# Patient Record
Sex: MALE | Race: WHITE | NOT HISPANIC OR LATINO | Employment: FULL TIME | URBAN - METROPOLITAN AREA
[De-identification: names, ages, dates, MRNs, and addresses within clinical notes are randomized per-mention and may not be internally consistent; named-entity substitution may affect disease eponyms.]

---

## 2024-04-17 ENCOUNTER — OFFICE VISIT (OUTPATIENT)
Dept: FAMILY MEDICINE CLINIC | Facility: CLINIC | Age: 49
End: 2024-04-17
Payer: COMMERCIAL

## 2024-04-17 ENCOUNTER — LAB (OUTPATIENT)
Dept: LAB | Facility: HOSPITAL | Age: 49
End: 2024-04-17
Payer: COMMERCIAL

## 2024-04-17 VITALS
DIASTOLIC BLOOD PRESSURE: 82 MMHG | OXYGEN SATURATION: 98 % | TEMPERATURE: 98 F | SYSTOLIC BLOOD PRESSURE: 132 MMHG | WEIGHT: 265 LBS | HEART RATE: 58 BPM | HEIGHT: 72 IN | BODY MASS INDEX: 35.89 KG/M2

## 2024-04-17 DIAGNOSIS — Z79.1 LONG TERM (CURRENT) USE OF NON-STEROIDAL ANTI-INFLAMMATORIES (NSAID): ICD-10-CM

## 2024-04-17 DIAGNOSIS — M54.42 CHRONIC BILATERAL LOW BACK PAIN WITH LEFT-SIDED SCIATICA: ICD-10-CM

## 2024-04-17 DIAGNOSIS — M16.11 PRIMARY OSTEOARTHRITIS OF RIGHT HIP: Primary | ICD-10-CM

## 2024-04-17 DIAGNOSIS — J30.89 ENVIRONMENTAL AND SEASONAL ALLERGIES: ICD-10-CM

## 2024-04-17 DIAGNOSIS — G89.29 CHRONIC BILATERAL LOW BACK PAIN WITH LEFT-SIDED SCIATICA: ICD-10-CM

## 2024-04-17 PROBLEM — J45.909 ASTHMA: Status: ACTIVE | Noted: 2022-02-28

## 2024-04-17 PROBLEM — K21.9 GASTROESOPHAGEAL REFLUX DISEASE WITHOUT ESOPHAGITIS: Status: ACTIVE | Noted: 2017-09-12

## 2024-04-17 LAB
ALBUMIN SERPL-MCNC: 4.4 G/DL (ref 3.5–5.2)
ALBUMIN/GLOB SERPL: 1.5 G/DL
ALP SERPL-CCNC: 98 U/L (ref 39–117)
ALT SERPL W P-5'-P-CCNC: 31 U/L (ref 1–41)
ANION GAP SERPL CALCULATED.3IONS-SCNC: 11.4 MMOL/L (ref 5–15)
AST SERPL-CCNC: 29 U/L (ref 1–40)
BILIRUB SERPL-MCNC: 0.5 MG/DL (ref 0–1.2)
BUN SERPL-MCNC: 13 MG/DL (ref 6–20)
BUN/CREAT SERPL: 14 (ref 7–25)
CALCIUM SPEC-SCNC: 9.2 MG/DL (ref 8.6–10.5)
CHLORIDE SERPL-SCNC: 104 MMOL/L (ref 98–107)
CO2 SERPL-SCNC: 24.6 MMOL/L (ref 22–29)
CREAT SERPL-MCNC: 0.93 MG/DL (ref 0.76–1.27)
EGFRCR SERPLBLD CKD-EPI 2021: 100.7 ML/MIN/1.73
GLOBULIN UR ELPH-MCNC: 3 GM/DL
GLUCOSE SERPL-MCNC: 99 MG/DL (ref 65–99)
POTASSIUM SERPL-SCNC: 4.1 MMOL/L (ref 3.5–5.2)
PROT SERPL-MCNC: 7.4 G/DL (ref 6–8.5)
SODIUM SERPL-SCNC: 140 MMOL/L (ref 136–145)

## 2024-04-17 PROCEDURE — 99204 OFFICE O/P NEW MOD 45 MIN: CPT | Performed by: FAMILY MEDICINE

## 2024-04-17 PROCEDURE — 80053 COMPREHEN METABOLIC PANEL: CPT

## 2024-04-17 RX ORDER — ALBUTEROL SULFATE 90 UG/1
2 AEROSOL, METERED RESPIRATORY (INHALATION) EVERY 4 HOURS PRN
COMMUNITY

## 2024-04-17 RX ORDER — MELOXICAM 15 MG/1
1 TABLET ORAL DAILY
COMMUNITY
Start: 2024-01-16 | End: 2024-04-17 | Stop reason: SINTOL

## 2024-04-17 RX ORDER — NAPROXEN SODIUM 220 MG
TABLET ORAL
COMMUNITY
End: 2024-04-17 | Stop reason: DRUGHIGH

## 2024-04-17 RX ORDER — OLOPATADINE HYDROCHLORIDE 1 MG/ML
SOLUTION OPHTHALMIC
COMMUNITY
Start: 2024-02-23 | End: 2024-04-17 | Stop reason: SDUPTHER

## 2024-04-17 RX ORDER — CYCLOBENZAPRINE HCL 5 MG
5 TABLET ORAL 3 TIMES DAILY PRN
Qty: 90 TABLET | Refills: 3 | Status: SHIPPED | OUTPATIENT
Start: 2024-04-17

## 2024-04-17 RX ORDER — OLOPATADINE HYDROCHLORIDE 1 MG/ML
1 SOLUTION OPHTHALMIC 2 TIMES DAILY
Qty: 5 ML | Refills: 3 | Status: SHIPPED | OUTPATIENT
Start: 2024-04-17

## 2024-04-17 RX ORDER — NAPROXEN 500 MG/1
500 TABLET ORAL 2 TIMES DAILY WITH MEALS
Qty: 60 TABLET | Refills: 5 | Status: SHIPPED | OUTPATIENT
Start: 2024-04-17

## 2024-04-17 RX ORDER — BACLOFEN 10 MG/1
TABLET ORAL
COMMUNITY
Start: 2024-04-05 | End: 2024-04-17 | Stop reason: CLARIF

## 2024-04-17 RX ORDER — FAMOTIDINE 40 MG/1
TABLET, FILM COATED ORAL
COMMUNITY
Start: 2024-03-07

## 2024-04-17 NOTE — ASSESSMENT & PLAN NOTE
Chronic low back pain which is recently worsened.  He has been following with Dr. Deleon at [facility] for injections.  He is going in for trial of numbing injections on Tuesday and if they can pinpoint where the injections need be given he will likely have an ablation in the future.  Patient will continue naproxen and will increase dose to 500 mg twice daily as needed.  Patient was also given prescription for Flexeril to use 3 times daily as needed for muscle spasms.  Referral to physical therapy for further evaluation and treatment was given as well.  Patient will sign records release so we can get outside records and imaging studies.  [facility] paperwork was filled out today we will place the patient off of work for 1 month and then he will follow-up with me in a month to reassess symptoms to see if he can go back to work at that time.  RTC/ED precautions given.

## 2024-04-17 NOTE — PROGRESS NOTES
Roly Mercedes is a 48 y.o. male who presents today to establish care.    Chief Complaint   Patient presents with    Hip Pain    Back Pain        Patient has had chronic low back pain with left sided sciatica and  chronic right hip pain. He does not have any injury in the past to the hip or back but works as a  and is lifting, twisting, and bending all day. He has seen orthopedic surgery and pain management. He follows with Dr. Moya at [facility] and has had steroid injections in the right hip and low back. He is going in for numbing injections next Tuesday with the goal of possibly doing an ablation if the numbing injections work. He has been to PT in the past but did not get improvement. He has had discectomy at L5-S1 in 2018 or 2019. He has been taking naproxen and baclofen. He has seen some control of pain with naproxen. He is also uses an topical pain relief from Dr. Moya. He has been seeing [facility] for his PCP and she has left the practice. He denies incontinence, weakness, and saddle anesthesia.            Review of Systems   Constitutional:  Negative for fever and unexpected weight loss.   HENT:  Negative for congestion, ear pain and sore throat.    Eyes:  Negative for visual disturbance.   Respiratory:  Negative for cough, shortness of breath and wheezing.    Cardiovascular:  Negative for chest pain and palpitations.   Gastrointestinal:  Negative for abdominal pain, blood in stool, constipation, diarrhea, nausea, vomiting and GERD.   Endocrine: Negative for polydipsia and polyuria.   Genitourinary:  Negative for difficulty urinating.   Musculoskeletal:  Positive for arthralgias and back pain. Negative for joint swelling.   Skin:  Negative for rash and skin lesions.   Allergic/Immunologic: Negative for environmental allergies.   Neurological:  Positive for numbness (some tingling and numbness in the left leg). Negative for seizures and syncope.   Hematological:  Does not bruise/bleed easily.    Psychiatric/Behavioral:  Negative for suicidal ideas.         PHQ-9 Depression Screening  Little interest or pleasure in doing things? 0-->not at all   Feeling down, depressed, or hopeless? 0-->not at all   Trouble falling or staying asleep, or sleeping too much?     Feeling tired or having little energy?     Poor appetite or overeating?     Feeling bad about yourself - or that you are a failure or have let yourself or your family down?     Trouble concentrating on things, such as reading the newspaper or watching television?     Moving or speaking so slowly that other people could have noticed? Or the opposite - being so fidgety or restless that you have been moving around a lot more than usual?     Thoughts that you would be better off dead, or of hurting yourself in some way?     PHQ-9 Total Score 0   If you checked off any problems, how difficult have these problems made it for you to do your work, take care of things at home, or get along with other people?         Past Medical History:   Diagnosis Date    Anxiety     Arthritis     Depression         Past Surgical History:   Procedure Laterality Date    BACK SURGERY      S1- L5    VASECTOMY          Family History   Problem Relation Age of Onset    Other Mother         unknown    Diabetes Father     No Known Problems Brother     Other Maternal Grandmother         unknown    Other Maternal Grandfather         unknown    Other Paternal Grandmother         unknown    Other Paternal Grandfather         unknown    No Known Problems Son     No Known Problems Son         Social History     Socioeconomic History    Marital status:    Tobacco Use    Smoking status: Never     Passive exposure: Never    Smokeless tobacco: Current     Types: Snuff   Vaping Use    Vaping status: Never Used   Substance and Sexual Activity    Alcohol use: Never    Drug use: Never    Sexual activity: Yes     Partners: Female     Birth control/protection: Vasectomy     Comment: 1  "female partner in the last year        Current Outpatient Medications on File Prior to Visit   Medication Sig Dispense Refill    Pepcid 40 MG tablet Take 1 tablet every day by oral route as needed.      sertraline (ZOLOFT) 50 MG tablet Take 1 tablet by mouth Daily.      [DISCONTINUED] baclofen (LIORESAL) 10 MG tablet Take 1 tablet twice a day by oral route as needed for 30 days.      [DISCONTINUED] naproxen sodium (Aleve) 220 MG tablet Take 1 tablet every 12 hours by oral route.      [DISCONTINUED] Pataday 0.1 % ophthalmic solution       albuterol sulfate  (90 Base) MCG/ACT inhaler Inhale 2 puffs Every 4 (Four) Hours As Needed for Wheezing.      [DISCONTINUED] meloxicam (MOBIC) 15 MG tablet Take 1 tablet by mouth Daily. (Patient not taking: Reported on 4/17/2024)       No current facility-administered medications on file prior to visit.       Allergies   Allergen Reactions    Sulfa Antibiotics Other (See Comments)        Visit Vitals  /82   Pulse 58   Temp 98 °F (36.7 °C) (Infrared)   Ht 182.9 cm (72\")   Wt 120 kg (265 lb)   SpO2 98%   BMI 35.94 kg/m²      Body mass index is 35.94 kg/m².    Physical Exam  Constitutional:       General: He is not in acute distress.     Appearance: He is well-developed. He is not diaphoretic.   HENT:      Head: Atraumatic.   Cardiovascular:      Rate and Rhythm: Normal rate and regular rhythm.      Heart sounds: Normal heart sounds. No murmur heard.     No friction rub. No gallop.   Pulmonary:      Effort: Pulmonary effort is normal. No respiratory distress.      Breath sounds: Normal breath sounds. No stridor. No wheezing, rhonchi or rales.   Abdominal:      General: Bowel sounds are normal. There is no distension.      Palpations: Abdomen is soft. There is no mass.      Tenderness: There is no abdominal tenderness. There is no guarding or rebound.      Hernia: No hernia is present.   Musculoskeletal:      Cervical back: Normal range of motion and neck supple.      " Thoracic back: Normal.      Lumbar back: Spasms and tenderness present. No swelling, deformity, lacerations or bony tenderness. Decreased range of motion. Positive left straight leg raise test. Negative right straight leg raise test.      Right hip: No deformity, tenderness, bony tenderness or crepitus. Normal range of motion.   Skin:     General: Skin is warm and dry.   Neurological:      Mental Status: He is alert and oriented to person, place, and time.   Psychiatric:         Behavior: Behavior normal.          No results found for this or any previous visit.     Problems Addressed this Visit          Allergies and Adverse Reactions    Environmental and seasonal allergies     Chronic.  Patient's main symptom is itchy eyes for which he uses Pataday as needed.  Patient was given refill today.         Relevant Medications    Pataday 0.1 % ophthalmic solution       Health Encounters    Long term (current) use of non-steroidal anti-inflammatories (nsaid)    Relevant Orders    Comprehensive Metabolic Panel       Musculoskeletal and Injuries    Osteoarthritis of right hip - Primary     Chronic and stable.  Patient follows with orthopedic surgery.  He will continue naproxen and increase dose to 500 mg twice daily.  Patient given referral to physical therapy for further evaluation and treatment.  RTC/ED precautions given.         Relevant Medications    naproxen (Naprosyn) 500 MG tablet    Other Relevant Orders    Ambulatory Referral to Physical Therapy Evaluate and treat    Chronic bilateral low back pain with left-sided sciatica     Chronic low back pain which is recently worsened.  He has been following with Dr. Deleon at [facility] for injections.  He is going in for trial of numbing injections on Tuesday and if they can pinpoint where the injections need be given he will likely have an ablation in the future.  Patient will continue naproxen and will increase dose to 500 mg twice daily as needed.  Patient was  also given prescription for Flexeril to use 3 times daily as needed for muscle spasms.  Referral to physical therapy for further evaluation and treatment was given as well.  Patient will sign records release so we can get outside records and imaging studies.  [place] paperwork was filled out today we will place the patient off of work for 1 month and then he will follow-up with me in a month to reassess symptoms to see if he can go back to work at that time.  RTC/ED precautions given.         Relevant Medications    naproxen (Naprosyn) 500 MG tablet    cyclobenzaprine (FLEXERIL) 5 MG tablet    Other Relevant Orders    Ambulatory Referral to Physical Therapy Evaluate and treat     Diagnoses         Codes Comments    Primary osteoarthritis of right hip    -  Primary ICD-10-CM: M16.11  ICD-9-CM: 715.15     Chronic bilateral low back pain with left-sided sciatica     ICD-10-CM: M54.42, G89.29  ICD-9-CM: 724.2, 724.3, 338.29     Environmental and seasonal allergies     ICD-10-CM: J30.89  ICD-9-CM: 477.8     Long term (current) use of non-steroidal anti-inflammatories (nsaid)     ICD-10-CM: Z79.1  ICD-9-CM: V58.64             Return in about 29 days (around 5/16/2024) for Follow-up back pain and hip pain.    Roldan More MD  4/17/2024

## 2024-04-17 NOTE — ASSESSMENT & PLAN NOTE
Chronic and stable.  Patient follows with orthopedic surgery.  He will continue naproxen and increase dose to 500 mg twice daily.  Patient given referral to physical therapy for further evaluation and treatment.  RTC/ED precautions given.

## 2024-04-17 NOTE — ASSESSMENT & PLAN NOTE
Chronic.  Patient's main symptom is itchy eyes for which he uses Pataday as needed.  Patient was given refill today.

## 2024-04-17 NOTE — PATIENT INSTRUCTIONS
Chronic Back Pain  Chronic back pain is back pain that lasts longer than 3 months. The cause of your back pain may not be known. Some common causes include:  Wear and tear (degenerative disease) of the bones, disks, or tissues that connect bones to each other (ligaments) in your back.  Inflammation and stiffness in your back (arthritis).  If you have chronic back pain, you may have times when the pain is more intense (flare-ups). You can also learn to manage the pain with home care.  Follow these instructions at home:  Watch for any changes in your symptoms. Take these actions to help with your pain:  Managing pain and stiffness         If told, put ice on the painful area. You may be told to apply ice for the first 24-48 hours after a flare-up starts.  Put ice in a plastic bag.  Place a towel between your skin and the bag.  Leave the ice on for 20 minutes, 2-3 times per day.  If told, apply heat to the affected area as often as told by your health care provider. Use the heat source that your provider recommends, such as a moist heat pack or a heating pad.  Place a towel between your skin and the heat source.  Leave the heat on for 20-30 minutes.  If your skin turns bright red, remove the ice or heat right away to prevent skin damage. The risk of damage is higher if you cannot feel pain, heat, or cold.  Try soaking in a warm tub.  Activity              Avoid bending and other activities that make the pain worse.  Have good posture when you stand or sit.  When you stand, keep your upper back and neck straight, with your shoulders pulled back. Avoid slouching.  When you sit, keep your back straight. Relax your shoulders. Do not round your shoulders or pull them backward.  Do not sit or  one place for too long.  Take brief periods of rest during the day. This will reduce your pain. Resting in a lying or standing position is often better than sitting to rest.  When you rest for longer periods, mix in some mild  activity or stretching between periods of rest. This will help to prevent stiffness and pain.  Get regular exercise. Ask your provider what activities are safe for you.  You may have to avoid lifting. Ask your provider how much you can safely lift. If you do lift, always use the right technique. This means you should:  Bend your knees.  Keep the load close to your body.  Avoid twisting.  Medicines  Take over-the-counter and prescription medicines only as told by your provider.  You may need to take medicines for pain and inflammation. These may be taken by mouth or put on the skin. You may also be given muscle relaxants.  Ask your provider if the medicine prescribed to you:  Requires you to avoid driving or using machinery.  Can cause constipation. You may need to take these actions to prevent or treat constipation:  Drink enough fluid to keep your pee (urine) pale yellow.  Take over-the-counter or prescription medicines.  Eat foods that are high in fiber, such as beans, whole grains, and fresh fruits and vegetables.  Limit foods that are high in fat and processed sugars, such as fried or sweet foods.  General instructions    Sleep on a firm mattress in a comfortable position. Try lying on your side with your knees slightly bent. If you lie on your back, put a pillow under your knees.  Do not use any products that contain nicotine or tobacco. These products include cigarettes, chewing tobacco, and vaping devices, such as e-cigarettes. If you need help quitting, ask your provider.  Contact a health care provider if:  You have pain that does not get better with rest or medicine.  You have new pain.  You have a fever.  You lose weight quickly.  You have trouble doing your normal activities.  You feel weak or numb in one or both of your legs or feet.  Get help right away if:  You are not able to control when you pee or poop.  You have severe back pain and:  Nausea or vomiting.  Pain in your chest or abdomen.  Shortness  of breath.  You faint.  These symptoms may be an emergency. Get help right away. Call 911.  Do not wait to see if the symptoms will go away.  Do not drive yourself to the hospital.  This information is not intended to replace advice given to you by your health care provider. Make sure you discuss any questions you have with your health care provider.  Document Revised: 08/07/2023 Document Reviewed: 08/07/2023  [employer] Patient Education © 2023 [employer]

## 2024-04-26 ENCOUNTER — TREATMENT (OUTPATIENT)
Dept: PHYSICAL THERAPY | Facility: CLINIC | Age: 49
End: 2024-04-26
Payer: COMMERCIAL

## 2024-04-26 DIAGNOSIS — M16.11 ARTHRITIS OF RIGHT HIP: ICD-10-CM

## 2024-04-26 DIAGNOSIS — M25.551 RIGHT HIP PAIN: ICD-10-CM

## 2024-04-26 DIAGNOSIS — M54.42 CHRONIC LEFT-SIDED LOW BACK PAIN WITH LEFT-SIDED SCIATICA: Primary | ICD-10-CM

## 2024-04-26 DIAGNOSIS — M54.17 RADICULOPATHY, LUMBOSACRAL REGION: ICD-10-CM

## 2024-04-26 DIAGNOSIS — G89.29 CHRONIC LEFT-SIDED LOW BACK PAIN WITH LEFT-SIDED SCIATICA: Primary | ICD-10-CM

## 2024-04-26 PROCEDURE — 97012 MECHANICAL TRACTION THERAPY: CPT | Performed by: PHYSICAL THERAPIST

## 2024-04-26 PROCEDURE — 97014 ELECTRIC STIMULATION THERAPY: CPT | Performed by: PHYSICAL THERAPIST

## 2024-04-26 PROCEDURE — 97110 THERAPEUTIC EXERCISES: CPT | Performed by: PHYSICAL THERAPIST

## 2024-04-26 PROCEDURE — 97162 PT EVAL MOD COMPLEX 30 MIN: CPT | Performed by: PHYSICAL THERAPIST

## 2024-04-26 NOTE — PROGRESS NOTES
Physical Therapy Initial Evaluation and Plan of Care  [facility] [place]  [address], [address]  [place] [address]        Patient: Roly Mercedes   : 1975  Diagnosis/ICD-10 Code:  Chronic left-sided low back pain with left-sided sciatica [M54.42, G89.29]  Referring practitioner: Roldan More MD  Date of Initial Visit: 2024  Today's Date: 2024  Patient seen for 1 sessions         Visit Diagnoses:    ICD-10-CM ICD-9-CM   1. Chronic left-sided low back pain with left-sided sciatica  M54.42 724.2    G89.29 724.3     338.29   2. Radiculopathy, lumbosacral region  M54.17 724.4   3. Right hip pain  M25.551 719.45   4. Arthritis of right hip  M16.11 716.95         Subjective Questionnaire: Oswestry:   LEFS: 15/80    Subjective Evaluation    History of Present Illness  Mechanism of injury: 4-5 years ago had a discectomy L5-S1 through [facility], within a month blew it out again, went through rehab. Over time it improved some, went back to work.. 6 months ago the pain significantly increased.  Has seen neurosurgeons for his back, and orthopedic surgeon for the right hip pain. He was told that he needs to Hip replacement but needed to put it off as long as possible.  Indicated that neurosurgery told him only thing they could do for lumbar was a fusion, which he wants to avoid.    Has done hip injections, back injections, numbing shots to test for ablation. He did get a little relief, didn't get any spasming pains. Doesn't sleep at night due to pain. Has been to other PT clinics and has had traction feels like it may help a little. Has an inversion table at home, can not always tolerate it, but times can use it for short period of time.    CURRENT COMPLAINTS  Lumbar: Constant left lumbar pain with varying intensity.  Intermittent but daily pain down the left lower extremity past the knee.  Symptoms are more down to the lateral and posterior aspect of the left lower extremity.   Symptoms disrupt his sleep.  Worse with prolonged positions, worsens as the day progresses, prolonged standing, going sit/stand, coughing and sneezing.      Right hip: Intermittent right anterior hip joint pain present most of the time.  Right hip pain can become severe at times.  Worse with sitting, prolonged standing, prolonged walking, flexing hip past 90 degrees, and right side-lying.      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     Hip   Hip (Left): Increased flexion.   Hip (Right): Increased flexion.     Tenderness     Additional Tenderness Details  Left L4-L5, left L5-S1 very tender and causes radiating discomfort.  Mild to moderate tenderness over the left SI joint but not as severe as the lumbar spine.  Very tender anterior right hip joint.    Neurological Testing     Reflexes   Left   Patellar (L4): trace (1+)  Achilles (S1): normal (2+)    Right   Patellar (L4): normal (2+)  Achilles (S1): normal (2+)    Active Range of Motion     Lumbar   Flexion: 70 (More painful to come back upright.  Was to flex.) degrees with pain  Extension: 15 degrees with pain  Left rotation: with pain  Right rotation: with pain    Right Hip   Flexion: 85 degrees with pain  Extension: 5 degrees with pain  Abduction: 30 degrees with pain    Additional Active Range of Motion Details  He avoids rotation movements of the trunk.    Passive Range of Motion     Right Hip   Flexion: 105 degrees with pain  Abduction: 50 degrees with pain  External rotation (90/90): 60 degrees with pain  Internal rotation (90/90): 40 degrees with pain    Strength/Myotome Testing      Left Hip   Planes of Motion   Flexion: 3-  Extension: 5  Abduction: 5    Right Hip   Planes of Motion   Flexion: 3  Extension: 4+  Abduction: 4+    Left Knee   Extension: 3+    Right Knee   Extension: 3+    Left Ankle/Foot   Dorsiflexion: 4+  Plantar flexion: 4  Great toe extension: 4    Right Ankle/Foot   Dorsiflexion: 4+  Plantar flexion: 4  Great toe extension: 4    Tests       Thoracic   Positive slump.     Lumbar     Left   Positive passive SLR and quadrant.     Right   Negative passive SLR.     Right Hip   Positive WILLA, FADIR and scour.     Additional Tests Details  Prone bilateral lower extremity distraction increases right hip pain.    Prone left lower extremity distraction recent lumbar pain and left lower extremities    Ambulation   Weight-Bearing Status   Weight-Bearing Status (Left): full weight bearing   Weight-Bearing Status (Right): full weight-bearing    Assistive device used: none    Observational Gait   Gait: antalgic     Quality of Movement During Gait   Trunk  Forward lean.           Assessment & Plan       Assessment  Impairments: abnormal or restricted ROM, activity intolerance, lacks appropriate home exercise program and pain with function   Functional limitations: carrying objects, lifting, sleeping, pulling, pushing, uncomfortable because of pain, sitting, standing and stooping   Assessment details: Patient is a 48 old male, presenting with a chronic left sided lumbar spine pain likely of discogenic origin though cannot rule out other origins.  They presents with signs and symptoms of chronic low back pain with left radiating symptoms down the lateral aspect of his lower extremity to the knee.  Muscle guarding is significant at this time with paraspinals and QL.  Cannot rule out SI joint involvement at this time.  Neurological exam is abnormal today as he demonstrates significant neural tension when his left leg is extended during the slump test and a positive SLR.      Right hip has  imaging studies and clinical signs and symptoms of degenerative joint disease.  His hip range of motion is within normal limits but is very painful.    Patient seen a significant exacerbation of both his lumbar spine and right hip.  Suspect the lumbar is a bigger issue at this point in time.  Right hip can probably be managed with medication injections and rehab.  Prognosis: good    Goals  Plan Goals: STG to be met in 4 weeks  1.  Pt has a reduction in worst pain to 6/10.  2.  Pt has centralization of symptoms so that their symptoms improve.  3.  Pt has improved function so that Oswestry score improves by 25%  4.  Pt is able tolerate sitting/standing for up to 30 minutes before needing to change position.  5.Pt demonstrates a negative SLR signifying improved neural tension    LTG to be met in 12 weeks  1.  Pt is independent with each phase of HEP.  2.  Pt has centralization of symptoms so that their symptoms are tolerable per their report.  3.  Pt has improved function so that Oswestry score improves by 75%.  4.  Pt has a reduction in worst pain to 3/10.  5.  Pt is able to return to work with tolerable discomfort.      Plan  Therapy options: will be seen for skilled therapy services  Planned modality interventions: dry needling, high voltage pulsed current (pain management), high voltage pulsed current (spasm management), ultrasound, traction, cryotherapy, TENS, thermotherapy (hydrocollator packs) and electrical stimulation/Russian stimulation  Planned therapy interventions: abdominal trunk stabilization, balance/weight-bearing training, body mechanics training, flexibility, functional ROM exercises, home exercise program, joint mobilization, strengthening, stretching, therapeutic activities, spinal/joint mobilization, soft tissue mobilization, postural training, neuromuscular re-education and manual therapy  Frequency: 1x week  Duration in weeks: 12      Timed:         Manual Therapy:         mins  02199;      Therapeutic Exercise:    8     mins  63678;     Neuromuscular Chano:        mins  00128;    Therapeutic Activity:          mins  16049;     Gait Training:           mins  96810;     Ultrasound:          mins  24444;    Ionto                                   mins   12041  Self Care                            mins   42715  Canalith Repos         mins 16929      Un-Timed:  Electrical Stimulation:   20      mins  10348 ( );  Dry Needling          mins self-pay  Traction     20     mins 61493  Low Eval          Mins  69645  Mod Eval     30     Mins  67684  High Eval                            Mins  57692        Timed Treatment:   8   mins   Total Treatment:     78   mins    Madelin Farr, Student Physical Therapist    I was present in the PT Department guiding the student by approving, concurring, and confirming the skilled judgement for all services rendered.       PT: Roly Fountain PT     License Number: [id number]  Electronically signed by Madelin Farr PT Student, 04/26/24, 8:04 AM EDT    Initial Certification  Certification Period: 7/25/2024  I certify that the therapy services are furnished while this patient is under my care.  The services outlined above are required by this patient, and will be reviewed every 90 days.     PHYSICIAN: ________________________________________________________  Roldan More MD        DATE: ____________________________________________________________    Please sign and return via fax to [phone].. Thank you, [facility].

## 2024-04-29 ENCOUNTER — TREATMENT (OUTPATIENT)
Dept: PHYSICAL THERAPY | Facility: CLINIC | Age: 49
End: 2024-04-29
Payer: COMMERCIAL

## 2024-04-29 DIAGNOSIS — M16.11 ARTHRITIS OF RIGHT HIP: ICD-10-CM

## 2024-04-29 DIAGNOSIS — M54.42 CHRONIC LEFT-SIDED LOW BACK PAIN WITH LEFT-SIDED SCIATICA: Primary | ICD-10-CM

## 2024-04-29 DIAGNOSIS — M54.17 RADICULOPATHY, LUMBOSACRAL REGION: ICD-10-CM

## 2024-04-29 DIAGNOSIS — G89.29 CHRONIC LEFT-SIDED LOW BACK PAIN WITH LEFT-SIDED SCIATICA: Primary | ICD-10-CM

## 2024-04-29 DIAGNOSIS — M25.551 RIGHT HIP PAIN: ICD-10-CM

## 2024-04-29 PROCEDURE — 97012 MECHANICAL TRACTION THERAPY: CPT | Performed by: PHYSICAL THERAPIST

## 2024-04-29 PROCEDURE — 97110 THERAPEUTIC EXERCISES: CPT | Performed by: PHYSICAL THERAPIST

## 2024-04-29 PROCEDURE — 97014 ELECTRIC STIMULATION THERAPY: CPT | Performed by: PHYSICAL THERAPIST

## 2024-04-29 NOTE — PROGRESS NOTES
Physical Therapy Daily Treatment Note  [facility] [place]  [address], [address]  [facility] [place] [address]      Patient: Roly Mercedes   : 1975  Diagnosis/ICD-10 Code:  Chronic left-sided low back pain with left-sided sciatica [M54.42, G89.29]  Referring practitioner: Roldan More MD  Date of Initial Visit: Type: THERAPY  Noted: 2024  Today's Date: 2024  Patient seen for 2 sessions         Visit Diagnoses:    ICD-10-CM ICD-9-CM   1. Chronic left-sided low back pain with left-sided sciatica  M54.42 724.2    G89.29 724.3     338.29   2. Radiculopathy, lumbosacral region  M54.17 724.4   3. Right hip pain  M25.551 719.45   4. Arthritis of right hip  M16.11 716.95       Subjective   Roly Mercedes reports: hip pain bad over weekend.  The press up seem helpful. Was painful after PT evaluation on Friday.  No left LE symptoms today.  LBP 3/10.      Objective   OBSERVATION: posture WNL.  Gait, antalgic when he 1st stands from sitting, improves with steps.  PALPATION: Right anterior hip joint.  Left lower lumbar facet area.  ROM: Right hip flexion 100 deg with pain.  STRENGTH: Right hip flexion 4-/5, extension 4-/5.  OTHER: Prone distraction decrease hip and lumbar pain.    See Exercise, Manual, and Modality Logs for complete treatment.     Access Code: [email or web address]  URL: [email or web address]  Date: 2024  Prepared by: Roly Fountain    Exercises  - Prone Quadriceps Stretch with Strap  - 2 x daily - 7 x weekly - 1 sets - 10 reps - 10 seconds hold  - Doorway Kneeling Hip Flexor Stretch  - 2 x daily - 7 x weekly - 2 sets - 10 reps - 5 seconds hold  - Supine Bridge  - 2 x daily - 7 x weekly - 2-3 sets - 10 reps    Assessment/Plan  Patient's right hip pain appears to be more superficial than actual hip joint itself today.  He is very tender and guarded in the hip flexors and the anterior fascia.  He has weakness around the hip joint.  He continues to respond well to the traction of  the lumbar spine.  Hip pain lessen as he worked through some of his exercises today.  Progress per Plan of Care and Progress strengthening /stabilization /functional activity           Timed:         Manual Therapy:         mins  96768;     Therapeutic Exercise:    30     mins  74783;     Neuromuscular Chano:        mins  00850;    Therapeutic Activity:          mins  72332;     Gait Training:           mins  01423;     Ultrasound:          mins  08060;    Ionto                                   mins   90177  Self Care                            mins   68043  Canalith Repos         mins 45856      Un-Timed:  Electrical Stimulation:    20     mins  02487 ( );  Dry Needling          mins self-pay  Traction     20     mins 02347      Timed Treatment:   30   mins   Total Treatment:     70   mins    Roly Fountain, PT  [place] License: [id number]

## 2024-05-03 ENCOUNTER — TREATMENT (OUTPATIENT)
Dept: PHYSICAL THERAPY | Facility: CLINIC | Age: 49
End: 2024-05-03
Payer: COMMERCIAL

## 2024-05-03 DIAGNOSIS — G89.29 CHRONIC LEFT-SIDED LOW BACK PAIN WITH LEFT-SIDED SCIATICA: Primary | ICD-10-CM

## 2024-05-03 DIAGNOSIS — M54.17 RADICULOPATHY, LUMBOSACRAL REGION: ICD-10-CM

## 2024-05-03 DIAGNOSIS — M16.11 ARTHRITIS OF RIGHT HIP: ICD-10-CM

## 2024-05-03 DIAGNOSIS — M54.42 CHRONIC LEFT-SIDED LOW BACK PAIN WITH LEFT-SIDED SCIATICA: Primary | ICD-10-CM

## 2024-05-03 DIAGNOSIS — M25.551 RIGHT HIP PAIN: ICD-10-CM

## 2024-05-03 PROCEDURE — 97014 ELECTRIC STIMULATION THERAPY: CPT | Performed by: PHYSICAL THERAPIST

## 2024-05-03 PROCEDURE — 97012 MECHANICAL TRACTION THERAPY: CPT | Performed by: PHYSICAL THERAPIST

## 2024-05-03 PROCEDURE — 97140 MANUAL THERAPY 1/> REGIONS: CPT | Performed by: PHYSICAL THERAPIST

## 2024-05-03 PROCEDURE — 97110 THERAPEUTIC EXERCISES: CPT | Performed by: PHYSICAL THERAPIST

## 2024-05-13 ENCOUNTER — TREATMENT (OUTPATIENT)
Dept: PHYSICAL THERAPY | Facility: CLINIC | Age: 49
End: 2024-05-13
Payer: COMMERCIAL

## 2024-05-13 DIAGNOSIS — G89.29 CHRONIC LEFT-SIDED LOW BACK PAIN WITH LEFT-SIDED SCIATICA: Primary | ICD-10-CM

## 2024-05-13 DIAGNOSIS — M16.11 ARTHRITIS OF RIGHT HIP: ICD-10-CM

## 2024-05-13 DIAGNOSIS — M25.551 RIGHT HIP PAIN: ICD-10-CM

## 2024-05-13 DIAGNOSIS — M54.17 RADICULOPATHY, LUMBOSACRAL REGION: ICD-10-CM

## 2024-05-13 DIAGNOSIS — M54.42 CHRONIC LEFT-SIDED LOW BACK PAIN WITH LEFT-SIDED SCIATICA: Primary | ICD-10-CM

## 2024-05-13 PROCEDURE — 97012 MECHANICAL TRACTION THERAPY: CPT | Performed by: PHYSICAL THERAPIST

## 2024-05-13 PROCEDURE — 97014 ELECTRIC STIMULATION THERAPY: CPT | Performed by: PHYSICAL THERAPIST

## 2024-05-13 PROCEDURE — 97140 MANUAL THERAPY 1/> REGIONS: CPT | Performed by: PHYSICAL THERAPIST

## 2024-05-13 PROCEDURE — 97110 THERAPEUTIC EXERCISES: CPT | Performed by: PHYSICAL THERAPIST

## 2024-05-13 NOTE — PROGRESS NOTES
Physical Therapy Daily Treatment Note  [facility] [place] [facility] [place]  [address], [address]  [place] [facility] [address]      Patient: Roly Mercedes   : 1975  Diagnosis/ICD-10 Code:  Chronic left-sided low back pain with left-sided sciatica [M54.42, G89.29]  Referring practitioner: Roldan More MD  Date of Initial Visit: Type: THERAPY  Noted: 2024  Today's Date: 2024  Patient seen for 4 sessions         Visit Diagnoses:    ICD-10-CM ICD-9-CM   1. Chronic left-sided low back pain with left-sided sciatica  M54.42 724.2    G89.29 724.3     338.29   2. Radiculopathy, lumbosacral region  M54.17 724.4   3. Right hip pain  M25.551 719.45   4. Arthritis of right hip  M16.11 716.95       Subjective   Roly Mercedes reports: LBP is worsening over past week. Right hip is killing him.  Scheduled for lumbar radiofrequency rhizotomy tomorrow.  He is going to ask his physician to schedule him for a right hip joint injection.    Objective   OBSERVATION: Antalgic gait.  Hip flexion posture when standing and walking.  PALPATION: Tender left L5-S1 area.  Very tender at right anterior and posterior hip joints.  ROM: Right hip abduction 25 degrees with severe pain, right hip flexion 95 degrees with severe pain, internal rotation 5 degrees pain, external rotation 30 degrees pain.  STRENGTH: Strength around the hip is affected by the severity of the hip joint pain unable to truly assess.  OTHER: Lumbar distraction decreases pain.    See Exercise, Manual, and Modality Logs for complete treatment.     MANUAL THERAPY TO RIGHT HIP JOINT. MECHANICAL TRACTION TO LUMBAR SPINE.    Assessment/Plan  Patient gets some relief from the hip joint pain with the manual therapy.  Get some relief from the lumbar pain with traction.  Both pathologies are significantly reduced as soon his ability to function.  Suspect that if he were to get a hip joint arthroplasty this would probably help some of his back pain.  Progress per Plan  of Care and Progress strengthening /stabilization /functional activity           Timed:         Manual Therapy:    18     mins  05379;     Therapeutic Exercise:    10     mins  20444;     Neuromuscular Chano:        mins  71986;    Therapeutic Activity:          mins  21571;     Gait Training:           mins  86834;     Ultrasound:          mins  39993;    Ionto                                   mins   10519  Self Care                            mins   72303  Canalith Repos         mins 94724      Un-Timed:  Electrical Stimulation:    20     mins  52960 ( );  Dry Needling          mins self-pay  Traction     20     mins 78463      Timed Treatment:   18   mins   Total Treatment:     68   mins    Roly Fountain, PT  [place] License: [id number]

## 2024-05-16 ENCOUNTER — OFFICE VISIT (OUTPATIENT)
Dept: FAMILY MEDICINE CLINIC | Facility: CLINIC | Age: 49
End: 2024-05-16
Payer: COMMERCIAL

## 2024-05-16 VITALS
OXYGEN SATURATION: 96 % | TEMPERATURE: 98.2 F | HEIGHT: 72 IN | SYSTOLIC BLOOD PRESSURE: 138 MMHG | DIASTOLIC BLOOD PRESSURE: 92 MMHG | HEART RATE: 54 BPM | BODY MASS INDEX: 36.41 KG/M2 | WEIGHT: 268.8 LBS

## 2024-05-16 DIAGNOSIS — M54.42 CHRONIC BILATERAL LOW BACK PAIN WITH LEFT-SIDED SCIATICA: Primary | ICD-10-CM

## 2024-05-16 DIAGNOSIS — M16.11 PRIMARY OSTEOARTHRITIS OF RIGHT HIP: ICD-10-CM

## 2024-05-16 DIAGNOSIS — Z79.1 LONG TERM (CURRENT) USE OF NON-STEROIDAL ANTI-INFLAMMATORIES (NSAID): ICD-10-CM

## 2024-05-16 DIAGNOSIS — G89.29 CHRONIC BILATERAL LOW BACK PAIN WITH LEFT-SIDED SCIATICA: Primary | ICD-10-CM

## 2024-05-16 PROCEDURE — 99213 OFFICE O/P EST LOW 20 MIN: CPT | Performed by: FAMILY MEDICINE

## 2024-05-16 RX ORDER — AMOXICILLIN AND CLAVULANATE POTASSIUM 875; 125 MG/1; MG/1
TABLET, FILM COATED ORAL
COMMUNITY
Start: 2024-03-26

## 2024-05-16 NOTE — ASSESSMENT & PLAN NOTE
Patient has not been using naproxen consistently but does see some relief when he takes it.  Patient was advised to take naproxen twice daily with meals and Tylenol twice a day with meals and an extra dose Tylenol midday if needed.  He will continue to follow with pain management and physical therapy.  Will give patient a note to remain out of work until after he has the next round of injections.  Patient will follow-up in 6 to 8 weeks.  RTC/ED precautions given.

## 2024-05-16 NOTE — PROGRESS NOTES
Roly Mercedes is a 48 y.o. male who presents today for Back Pain and Hip Pain (Follow Up)      Patient is here to follow up on chronic back and hip pain. He has been taking naproxen 500mg twice a day and flexeril 5 mg TID. He feels like the medications have helped and flexeril helps him sleep. He got an injection yesterday and feels like it has helped. He has ablation scheduled June 3rd for the back and the next hip injection on June 10th. Both with Dr. Moya. He has been following with PT and sees improvement the day of but does not see lasting relief. He has not gone back to work yet. 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  Patient has no acute complaints today.             Review of Systems   Constitutional:  Negative for fever and unexpected weight loss.   HENT:  Negative for congestion, ear pain and sore throat.    Eyes:  Negative for visual disturbance.   Respiratory:  Negative for cough, shortness of breath and wheezing.    Cardiovascular:  Negative for chest pain and palpitations.   Gastrointestinal:  Negative for abdominal pain, blood in stool, constipation, diarrhea, nausea, vomiting and GERD.   Endocrine: Negative for polydipsia and polyuria.   Genitourinary:  Negative for difficulty urinating.   Musculoskeletal:  Positive for arthralgias and back pain. Negative for joint swelling.   Skin:  Negative for rash and skin lesions.   Allergic/Immunologic: Negative for environmental allergies.   Neurological:  Positive for numbness (some tingling and numbness in the left leg). Negative for seizures and syncope.   Hematological:  Does not bruise/bleed easily.   Psychiatric/Behavioral:  Negative for suicidal ideas.         The following portions of the patient's history were reviewed and updated as appropriate: allergies, current medications, past family history, past medical history, past social history, past surgical history and problem list.    Current  "Outpatient Medications on File Prior to Visit   Medication Sig Dispense Refill    amoxicillin-clavulanate (AUGMENTIN) 875-125 MG per tablet       cyclobenzaprine (FLEXERIL) 5 MG tablet Take 1 tablet by mouth 3 (Three) Times a Day As Needed for Muscle Spasms. 90 tablet 3    naproxen (Naprosyn) 500 MG tablet Take 1 tablet by mouth 2 (Two) Times a Day With Meals. 60 tablet 5    Pataday 0.1 % ophthalmic solution Administer 1 drop to both eyes 2 (Two) Times a Day. 5 mL 3    Pepcid 40 MG tablet Take 1 tablet every day by oral route as needed.      sertraline (ZOLOFT) 50 MG tablet Take 1 tablet by mouth Daily.      albuterol sulfate  (90 Base) MCG/ACT inhaler Inhale 2 puffs Every 4 (Four) Hours As Needed for Wheezing. (Patient not taking: Reported on 5/16/2024)       No current facility-administered medications on file prior to visit.       Allergies   Allergen Reactions    Sulfa Antibiotics Other (See Comments)        Visit Vitals  /92   Pulse 54   Temp 98.2 °F (36.8 °C) (Infrared)   Ht 182.9 cm (72\")   Wt 122 kg (268 lb 12.8 oz)   SpO2 96%   BMI 36.46 kg/m²        Physical Exam  Constitutional:       General: He is not in acute distress.     Appearance: He is well-developed. He is not diaphoretic.   HENT:      Head: Atraumatic.   Cardiovascular:      Rate and Rhythm: Normal rate and regular rhythm.      Heart sounds: Normal heart sounds. No murmur heard.     No friction rub. No gallop.   Pulmonary:      Effort: Pulmonary effort is normal. No respiratory distress.      Breath sounds: Normal breath sounds. No stridor. No wheezing, rhonchi or rales.   Abdominal:      General: Bowel sounds are normal. There is no distension.      Palpations: Abdomen is soft. There is no mass.      Tenderness: There is no abdominal tenderness. There is no guarding or rebound.      Hernia: No hernia is present.   Musculoskeletal:      Cervical back: Normal range of motion and neck supple.      Thoracic back: Normal.      Lumbar " back: Spasms and tenderness present. No swelling, deformity, lacerations or bony tenderness. Decreased range of motion. Positive left straight leg raise test. Negative right straight leg raise test.      Right hip: No deformity, tenderness, bony tenderness or crepitus. Normal range of motion.   Skin:     General: Skin is warm and dry.   Neurological:      Mental Status: He is alert and oriented to person, place, and time.   Psychiatric:         Behavior: Behavior normal.          Results for orders placed or performed in visit on 04/17/24   Comprehensive Metabolic Panel    Specimen: Blood   Result Value Ref Range    Glucose 99 65 - 99 mg/dL    BUN 13 6 - 20 mg/dL    Creatinine 0.93 0.76 - 1.27 mg/dL    Sodium 140 136 - 145 mmol/L    Potassium 4.1 3.5 - 5.2 mmol/L    Chloride 104 98 - 107 mmol/L    CO2 24.6 22.0 - 29.0 mmol/L    Calcium 9.2 8.6 - 10.5 mg/dL    Total Protein 7.4 6.0 - 8.5 g/dL    Albumin 4.4 3.5 - 5.2 g/dL    ALT (SGPT) 31 1 - 41 U/L    AST (SGOT) 29 1 - 40 U/L    Alkaline Phosphatase 98 39 - 117 U/L    Total Bilirubin 0.5 0.0 - 1.2 mg/dL    Globulin 3.0 gm/dL    A/G Ratio 1.5 g/dL    BUN/Creatinine Ratio 14.0 7.0 - 25.0    Anion Gap 11.4 5.0 - 15.0 mmol/L    eGFR 100.7 >60.0 mL/min/1.73        Problems Addressed this Visit          Health Encounters    Long term (current) use of non-steroidal anti-inflammatories (nsaid)    Relevant Orders    Comprehensive Metabolic Panel       Musculoskeletal and Injuries    Osteoarthritis of right hip     Patient will continue to follow with orthopedic surgery as well as physical therapy and pain management.  NSAID and Tylenol as above.  RTC/ED precautions given.         Chronic bilateral low back pain with left-sided sciatica - Primary     Patient has not been using naproxen consistently but does see some relief when he takes it.  Patient was advised to take naproxen twice daily with meals and Tylenol twice a day with meals and an extra dose Tylenol midday if  needed.  He will continue to follow with pain management and physical therapy.  Will give patient a note to remain out of work until after he has the next round of injections.  Patient will follow-up in 6 to 8 weeks.  RTC/ED precautions given.          Diagnoses         Codes Comments    Chronic bilateral low back pain with left-sided sciatica    -  Primary ICD-10-CM: M54.42, G89.29  ICD-9-CM: 724.2, 724.3, 338.29     Long term (current) use of non-steroidal anti-inflammatories (nsaid)     ICD-10-CM: Z79.1  ICD-9-CM: V58.64     Primary osteoarthritis of right hip     ICD-10-CM: M16.11  ICD-9-CM: 715.15             Return in about 7 weeks (around 7/3/2024) for Follow-up back pain and hip pain.    Roldan More MD   5/16/2024

## 2024-05-16 NOTE — ASSESSMENT & PLAN NOTE
Patient will continue to follow with orthopedic surgery as well as physical therapy and pain management.  NSAID and Tylenol as above.  RTC/ED precautions given.

## 2024-05-16 NOTE — PATIENT INSTRUCTIONS
Chronic Back Pain  Chronic back pain is back pain that lasts longer than 3 months. The cause of your back pain may not be known. Some common causes include:  Wear and tear (degenerative disease) of the bones, disks, or tissues that connect bones to each other (ligaments) in your back.  Inflammation and stiffness in your back (arthritis).  If you have chronic back pain, you may have times when the pain is more intense (flare-ups). You can also learn to manage the pain with home care.  Follow these instructions at home:  Watch for any changes in your symptoms. Take these actions to help with your pain:  Managing pain and stiffness         If told, put ice on the painful area. You may be told to apply ice for the first 24-48 hours after a flare-up starts.  Put ice in a plastic bag.  Place a towel between your skin and the bag.  Leave the ice on for 20 minutes, 2-3 times per day.  If told, apply heat to the affected area as often as told by your health care provider. Use the heat source that your provider recommends, such as a moist heat pack or a heating pad.  Place a towel between your skin and the heat source.  Leave the heat on for 20-30 minutes.  If your skin turns bright red, remove the ice or heat right away to prevent skin damage. The risk of damage is higher if you cannot feel pain, heat, or cold.  Try soaking in a warm tub.  Activity              Avoid bending and other activities that make the pain worse.  Have good posture when you stand or sit.  When you stand, keep your upper back and neck straight, with your shoulders pulled back. Avoid slouching.  When you sit, keep your back straight. Relax your shoulders. Do not round your shoulders or pull them backward.  Do not sit or  one place for too long.  Take brief periods of rest during the day. This will reduce your pain. Resting in a lying or standing position is often better than sitting to rest.  When you rest for longer periods, mix in some mild  activity or stretching between periods of rest. This will help to prevent stiffness and pain.  Get regular exercise. Ask your provider what activities are safe for you.  You may have to avoid lifting. Ask your provider how much you can safely lift. If you do lift, always use the right technique. This means you should:  Bend your knees.  Keep the load close to your body.  Avoid twisting.  Medicines  Take over-the-counter and prescription medicines only as told by your provider.  You may need to take medicines for pain and inflammation. These may be taken by mouth or put on the skin. You may also be given muscle relaxants.  Ask your provider if the medicine prescribed to you:  Requires you to avoid driving or using machinery.  Can cause constipation. You may need to take these actions to prevent or treat constipation:  Drink enough fluid to keep your pee (urine) pale yellow.  Take over-the-counter or prescription medicines.  Eat foods that are high in fiber, such as beans, whole grains, and fresh fruits and vegetables.  Limit foods that are high in fat and processed sugars, such as fried or sweet foods.  General instructions    Sleep on a firm mattress in a comfortable position. Try lying on your side with your knees slightly bent. If you lie on your back, put a pillow under your knees.  Do not use any products that contain nicotine or tobacco. These products include cigarettes, chewing tobacco, and vaping devices, such as e-cigarettes. If you need help quitting, ask your provider.  Contact a health care provider if:  You have pain that does not get better with rest or medicine.  You have new pain.  You have a fever.  You lose weight quickly.  You have trouble doing your normal activities.  You feel weak or numb in one or both of your legs or feet.  Get help right away if:  You are not able to control when you pee or poop.  You have severe back pain and:  Nausea or vomiting.  Pain in your chest or abdomen.  Shortness  of breath.  You faint.  These symptoms may be an emergency. Get help right away. Call 911.  Do not wait to see if the symptoms will go away.  Do not drive yourself to the hospital.  This information is not intended to replace advice given to you by your health care provider. Make sure you discuss any questions you have with your health care provider.  Document Revised: 08/07/2023 Document Reviewed: 08/07/2023  Elsevier Patient Education © 2024 [employer]

## 2024-05-16 NOTE — LETTER
May 16, 2024     Patient: Roly Mercedes   YOB: 1975   Date of Visit: 5/16/2024       To Whom It May Concern:    It is my medical opinion that Roly Mercedes should remain out of work until 7/10/24 and then may return to light duty.            Sincerely,        Roldan More MD    CC: No Recipients

## 2024-06-06 ENCOUNTER — TELEPHONE (OUTPATIENT)
Dept: FAMILY MEDICINE CLINIC | Facility: CLINIC | Age: 49
End: 2024-06-06
Payer: COMMERCIAL

## 2024-06-13 ENCOUNTER — TELEPHONE (OUTPATIENT)
Dept: FAMILY MEDICINE CLINIC | Facility: CLINIC | Age: 49
End: 2024-06-13

## 2024-06-13 NOTE — TELEPHONE ENCOUNTER
Caller: KAI    Relationship: [employer]    Best call back number: [phone]    Who are you requesting to speak with (clinical staff, provider,  specific staff member): CLINICAL STAFF    What was the call regarding: DID DR GRACIA RECEIVE THE FAX THEY SENT LAST WEEK?

## 2024-07-08 ENCOUNTER — TELEPHONE (OUTPATIENT)
Dept: FAMILY MEDICINE CLINIC | Facility: CLINIC | Age: 49
End: 2024-07-08

## 2024-07-11 NOTE — TELEPHONE ENCOUNTER
MARVA IS CALLING TO SEE IF DR GRACIA WILL BE FILLING OUT THE PHYSICIANS STATEMENT RESPONSE FORM   THEY ONLY NEED THE LAST PAGE BACK     PLEASE CALL     [phone] ex [phone]    FAX WAS SENT ON 5-31-24  AND 6-6-24

## 2024-07-17 ENCOUNTER — OFFICE VISIT (OUTPATIENT)
Dept: FAMILY MEDICINE CLINIC | Facility: CLINIC | Age: 49
End: 2024-07-17
Payer: COMMERCIAL

## 2024-07-17 VITALS
TEMPERATURE: 97.8 F | HEIGHT: 72 IN | DIASTOLIC BLOOD PRESSURE: 80 MMHG | BODY MASS INDEX: 35.35 KG/M2 | HEART RATE: 55 BPM | WEIGHT: 261 LBS | OXYGEN SATURATION: 99 % | SYSTOLIC BLOOD PRESSURE: 136 MMHG

## 2024-07-17 DIAGNOSIS — G89.29 CHRONIC BILATERAL LOW BACK PAIN WITH LEFT-SIDED SCIATICA: Primary | ICD-10-CM

## 2024-07-17 DIAGNOSIS — M54.42 CHRONIC BILATERAL LOW BACK PAIN WITH LEFT-SIDED SCIATICA: Primary | ICD-10-CM

## 2024-07-17 DIAGNOSIS — M16.11 PRIMARY OSTEOARTHRITIS OF RIGHT HIP: ICD-10-CM

## 2024-07-17 PROCEDURE — 99213 OFFICE O/P EST LOW 20 MIN: CPT | Performed by: FAMILY MEDICINE

## 2024-07-17 NOTE — Clinical Note
July 17, 2024     Patient: Roly Mercedes   YOB: 1975   Date of Visit: 7/17/2024       To Whom It May Concern:    It is my medical opinion that Roly Mercedes may return to work in one day.            Sincerely,        Roldan More MD    CC: No Recipients

## 2024-07-17 NOTE — PROGRESS NOTES
Roly Mercedes is a 48 y.o. male who presents today for Back Pain and Hip Pain (Has to have hip replacement.)      Patient is filing for disability custodial from [place]. He is following with Dr. Moya for injections and Dr. Smyth for right hip injections. The past hip injection worked well but the most recent one only lasted for 2 days. He feels like the injections in the back helped some and went in for a nerve ablation and got some relief but symptoms returned in about 3 days. His pain is not a severe as it was but is still present. He is no longer having tingling and numbness in is leg. He has been taking naproxen as needed and flexeril as needed and these provide some relief but do not completely resolve the pain.           Review of Systems   Constitutional:  Negative for fever and unexpected weight loss.   HENT:  Negative for congestion, ear pain and sore throat.    Eyes:  Negative for visual disturbance.   Respiratory:  Negative for cough, shortness of breath and wheezing.    Cardiovascular:  Negative for chest pain and palpitations.   Gastrointestinal:  Negative for abdominal pain, blood in stool, constipation, diarrhea, nausea, vomiting and GERD.   Endocrine: Negative for polydipsia and polyuria.   Genitourinary:  Negative for difficulty urinating.   Musculoskeletal:  Positive for arthralgias (right hip pain) and back pain. Negative for joint swelling.   Skin:  Negative for rash and skin lesions.   Allergic/Immunologic: Negative for environmental allergies.   Neurological:  Negative for seizures, syncope and numbness.   Hematological:  Does not bruise/bleed easily.   Psychiatric/Behavioral:  Negative for suicidal ideas.         The following portions of the patient's history were reviewed and updated as appropriate: allergies, current medications, past family history, past medical history, past social history, past surgical history and problem list.    Current Outpatient Medications on File  "Prior to Visit   Medication Sig Dispense Refill    albuterol sulfate  (90 Base) MCG/ACT inhaler Inhale 2 puffs Every 4 (Four) Hours As Needed for Wheezing.      cyclobenzaprine (FLEXERIL) 5 MG tablet Take 1 tablet by mouth 3 (Three) Times a Day As Needed for Muscle Spasms. 90 tablet 3    naproxen (Naprosyn) 500 MG tablet Take 1 tablet by mouth 2 (Two) Times a Day With Meals. 60 tablet 5    Pataday 0.1 % ophthalmic solution Administer 1 drop to both eyes 2 (Two) Times a Day. 5 mL 3    Pepcid 40 MG tablet Take 1 tablet every day by oral route as needed.      sertraline (ZOLOFT) 50 MG tablet Take 1 tablet by mouth Daily.      [DISCONTINUED] amoxicillin-clavulanate (AUGMENTIN) 875-125 MG per tablet        No current facility-administered medications on file prior to visit.       Allergies   Allergen Reactions    Sulfa Antibiotics Other (See Comments)        Visit Vitals  /80 (BP Location: Left arm, Patient Position: Sitting, Cuff Size: Large Adult)   Pulse 55   Temp 97.8 °F (36.6 °C)   Ht 182.9 cm (72\")   Wt 118 kg (261 lb)   SpO2 99%   BMI 35.40 kg/m²        Physical Exam  Constitutional:       General: He is not in acute distress.     Appearance: He is well-developed. He is not diaphoretic.   HENT:      Head: Atraumatic.   Cardiovascular:      Rate and Rhythm: Normal rate and regular rhythm.      Heart sounds: Normal heart sounds. No murmur heard.     No friction rub. No gallop.   Pulmonary:      Effort: Pulmonary effort is normal. No respiratory distress.      Breath sounds: Normal breath sounds. No stridor. No wheezing, rhonchi or rales.   Abdominal:      General: Bowel sounds are normal. There is no distension.      Palpations: Abdomen is soft. There is no mass.      Tenderness: There is no abdominal tenderness. There is no guarding or rebound.      Hernia: No hernia is present.   Musculoskeletal:      Cervical back: Normal range of motion and neck supple.      Thoracic back: Normal.      Lumbar back: " Spasms and tenderness present. No swelling, deformity, lacerations or bony tenderness. Decreased range of motion.      Right hip: No deformity, tenderness, bony tenderness or crepitus. Normal range of motion.   Skin:     General: Skin is warm and dry.   Neurological:      Mental Status: He is alert and oriented to person, place, and time.   Psychiatric:         Behavior: Behavior normal.          Results for orders placed or performed in visit on 04/17/24   Comprehensive Metabolic Panel    Specimen: Blood   Result Value Ref Range    Glucose 99 65 - 99 mg/dL    BUN 13 6 - 20 mg/dL    Creatinine 0.93 0.76 - 1.27 mg/dL    Sodium 140 136 - 145 mmol/L    Potassium 4.1 3.5 - 5.2 mmol/L    Chloride 104 98 - 107 mmol/L    CO2 24.6 22.0 - 29.0 mmol/L    Calcium 9.2 8.6 - 10.5 mg/dL    Total Protein 7.4 6.0 - 8.5 g/dL    Albumin 4.4 3.5 - 5.2 g/dL    ALT (SGPT) 31 1 - 41 U/L    AST (SGOT) 29 1 - 40 U/L    Alkaline Phosphatase 98 39 - 117 U/L    Total Bilirubin 0.5 0.0 - 1.2 mg/dL    Globulin 3.0 gm/dL    A/G Ratio 1.5 g/dL    BUN/Creatinine Ratio 14.0 7.0 - 25.0    Anion Gap 11.4 5.0 - 15.0 mmol/L    eGFR 100.7 >60.0 mL/min/1.73        Problems Addressed this Visit          Musculoskeletal and Injuries    Osteoarthritis of right hip    Chronic bilateral low back pain with left-sided sciatica - Primary     Chronic and stable low back and hip pain.  Left-sided sciatica has resolved..  Patient is following with Dr. Deleon for injections and recently had ablation.  The ablation worked for short period of time.  They are feeling that hip replacement may improve his back pain but Dr. Smyth is wanting to hold off on hip replacement due to his young age.  Patient will continue to work with both of the specialist.  He will continue naproxen as needed as well as Flexeril as needed.  Patient was given a letter to remain out of work.          Diagnoses         Codes Comments    Chronic bilateral low back pain with left-sided  sciatica    -  Primary ICD-10-CM: M54.42, G89.29  ICD-9-CM: 724.2, 724.3, 338.29     Primary osteoarthritis of right hip     ICD-10-CM: M16.11  ICD-9-CM: 715.15             Return in about 3 months (around 10/17/2024) for Annual.    Roldan More MD   7/17/2024

## 2024-07-17 NOTE — LETTER
July 17, 2024     Patient: Roly Mercedes   YOB: 1975   Date of Visit: 7/17/2024       To Whom it May Concern:    Roly Mercedes was seen in my clinic on 7/17/2024. He should remain out of work until follow-up appointment in 3 months at which point he will be re-examined.          Sincerely,          Roldan More MD        CC: No Recipients

## 2024-07-17 NOTE — ASSESSMENT & PLAN NOTE
Chronic and stable low back and hip pain.  Left-sided sciatica has resolved..  Patient is following with Dr. Deleon for injections and recently had ablation.  The ablation worked for short period of time.  They are feeling that hip replacement may improve his back pain but Dr. Smyth is wanting to hold off on hip replacement due to his young age.  Patient will continue to work with both of the specialist.  He will continue naproxen as needed as well as Flexeril as needed.  Patient was given a letter to remain out of work.

## 2024-08-13 ENCOUNTER — TELEPHONE (OUTPATIENT)
Dept: FAMILY MEDICINE CLINIC | Facility: CLINIC | Age: 49
End: 2024-08-13
Payer: COMMERCIAL

## 2024-08-13 NOTE — TELEPHONE ENCOUNTER
"    Caller: Roly Mercedes \"ANANYA\"    Relationship: Self    Best call back number: [phone]     What form or medical record are you requesting: LETTER TO BE OUT OF WORK.  NEED ONE TO SAY PATIENT HE HS CONTINUATION OF CARE FOR ONGOING MEDICAL SERVICES.   HAS HIP/BACK PROBLEM.      Who is requesting this form or medical record from you: ANANYA    How would you like to receive the form or medical records (pick-up, mail, fax): WILL      Timeframe paperwork needed: ASAP, WOULD LIKE TODAY.       PLEASE CALL ANANYA WHEN READY,   "

## 2024-10-23 ENCOUNTER — LAB (OUTPATIENT)
Dept: LAB | Facility: HOSPITAL | Age: 49
End: 2024-10-23
Payer: COMMERCIAL

## 2024-10-23 ENCOUNTER — OFFICE VISIT (OUTPATIENT)
Dept: FAMILY MEDICINE CLINIC | Facility: CLINIC | Age: 49
End: 2024-10-23
Payer: COMMERCIAL

## 2024-10-23 VITALS
HEART RATE: 62 BPM | TEMPERATURE: 98.9 F | BODY MASS INDEX: 36.65 KG/M2 | WEIGHT: 270.6 LBS | SYSTOLIC BLOOD PRESSURE: 144 MMHG | OXYGEN SATURATION: 97 % | HEIGHT: 72 IN | DIASTOLIC BLOOD PRESSURE: 90 MMHG

## 2024-10-23 DIAGNOSIS — E66.812 CLASS 2 OBESITY DUE TO EXCESS CALORIES WITHOUT SERIOUS COMORBIDITY WITH BODY MASS INDEX (BMI) OF 36.0 TO 36.9 IN ADULT: ICD-10-CM

## 2024-10-23 DIAGNOSIS — M54.42 CHRONIC BILATERAL LOW BACK PAIN WITH LEFT-SIDED SCIATICA: ICD-10-CM

## 2024-10-23 DIAGNOSIS — E66.09 CLASS 2 OBESITY DUE TO EXCESS CALORIES WITHOUT SERIOUS COMORBIDITY WITH BODY MASS INDEX (BMI) OF 36.0 TO 36.9 IN ADULT: ICD-10-CM

## 2024-10-23 DIAGNOSIS — Z00.00 WELL ADULT EXAM: ICD-10-CM

## 2024-10-23 DIAGNOSIS — Z00.00 WELL ADULT EXAM: Primary | ICD-10-CM

## 2024-10-23 DIAGNOSIS — Z12.5 PROSTATE CANCER SCREENING: ICD-10-CM

## 2024-10-23 DIAGNOSIS — G89.29 CHRONIC BILATERAL LOW BACK PAIN WITH LEFT-SIDED SCIATICA: ICD-10-CM

## 2024-10-23 DIAGNOSIS — M16.11 PRIMARY OSTEOARTHRITIS OF RIGHT HIP: ICD-10-CM

## 2024-10-23 LAB
ALBUMIN SERPL-MCNC: 4.2 G/DL (ref 3.5–5.2)
ALBUMIN/GLOB SERPL: 1.3 G/DL
ALP SERPL-CCNC: 108 U/L (ref 39–117)
ALT SERPL W P-5'-P-CCNC: 26 U/L (ref 1–41)
ANION GAP SERPL CALCULATED.3IONS-SCNC: 9.6 MMOL/L (ref 5–15)
AST SERPL-CCNC: 25 U/L (ref 1–40)
BASOPHILS # BLD AUTO: 0.03 10*3/MM3 (ref 0–0.2)
BASOPHILS NFR BLD AUTO: 0.5 % (ref 0–1.5)
BILIRUB SERPL-MCNC: 0.3 MG/DL (ref 0–1.2)
BUN SERPL-MCNC: 9 MG/DL (ref 6–20)
BUN/CREAT SERPL: 9.5 (ref 7–25)
CALCIUM SPEC-SCNC: 9.2 MG/DL (ref 8.6–10.5)
CHLORIDE SERPL-SCNC: 102 MMOL/L (ref 98–107)
CHOLEST SERPL-MCNC: 192 MG/DL (ref 0–200)
CO2 SERPL-SCNC: 26.4 MMOL/L (ref 22–29)
CREAT SERPL-MCNC: 0.95 MG/DL (ref 0.76–1.27)
DEPRECATED RDW RBC AUTO: 37.8 FL (ref 37–54)
EGFRCR SERPLBLD CKD-EPI 2021: 97.5 ML/MIN/1.73
EOSINOPHIL # BLD AUTO: 0.16 10*3/MM3 (ref 0–0.4)
EOSINOPHIL NFR BLD AUTO: 2.4 % (ref 0.3–6.2)
ERYTHROCYTE [DISTWIDTH] IN BLOOD BY AUTOMATED COUNT: 12.7 % (ref 12.3–15.4)
GLOBULIN UR ELPH-MCNC: 3.3 GM/DL
GLUCOSE SERPL-MCNC: 160 MG/DL (ref 65–99)
HBA1C MFR BLD: 6.1 % (ref 4.8–5.6)
HCT VFR BLD AUTO: 46.9 % (ref 37.5–51)
HCV AB SER QL: NORMAL
HDLC SERPL-MCNC: 40 MG/DL (ref 40–60)
HGB BLD-MCNC: 15.3 G/DL (ref 13–17.7)
IMM GRANULOCYTES # BLD AUTO: 0.02 10*3/MM3 (ref 0–0.05)
IMM GRANULOCYTES NFR BLD AUTO: 0.3 % (ref 0–0.5)
LDLC SERPL CALC-MCNC: 128 MG/DL (ref 0–100)
LDLC/HDLC SERPL: 3.14 {RATIO}
LYMPHOCYTES # BLD AUTO: 1.86 10*3/MM3 (ref 0.7–3.1)
LYMPHOCYTES NFR BLD AUTO: 28 % (ref 19.6–45.3)
MCH RBC QN AUTO: 27.1 PG (ref 26.6–33)
MCHC RBC AUTO-ENTMCNC: 32.6 G/DL (ref 31.5–35.7)
MCV RBC AUTO: 83 FL (ref 79–97)
MONOCYTES # BLD AUTO: 0.53 10*3/MM3 (ref 0.1–0.9)
MONOCYTES NFR BLD AUTO: 8 % (ref 5–12)
NEUTROPHILS NFR BLD AUTO: 4.05 10*3/MM3 (ref 1.7–7)
NEUTROPHILS NFR BLD AUTO: 60.8 % (ref 42.7–76)
NRBC BLD AUTO-RTO: 0 /100 WBC (ref 0–0.2)
PLATELET # BLD AUTO: 194 10*3/MM3 (ref 140–450)
PMV BLD AUTO: 10.6 FL (ref 6–12)
POTASSIUM SERPL-SCNC: 4.1 MMOL/L (ref 3.5–5.2)
PROT SERPL-MCNC: 7.5 G/DL (ref 6–8.5)
PSA SERPL-MCNC: 1.69 NG/ML (ref 0–4)
RBC # BLD AUTO: 5.65 10*6/MM3 (ref 4.14–5.8)
SODIUM SERPL-SCNC: 138 MMOL/L (ref 136–145)
TRIGL SERPL-MCNC: 133 MG/DL (ref 0–150)
TSH SERPL DL<=0.05 MIU/L-ACNC: 3.39 UIU/ML (ref 0.27–4.2)
VLDLC SERPL-MCNC: 24 MG/DL (ref 5–40)
WBC NRBC COR # BLD AUTO: 6.65 10*3/MM3 (ref 3.4–10.8)

## 2024-10-23 PROCEDURE — 99396 PREV VISIT EST AGE 40-64: CPT | Performed by: FAMILY MEDICINE

## 2024-10-23 PROCEDURE — 80061 LIPID PANEL: CPT

## 2024-10-23 PROCEDURE — G0103 PSA SCREENING: HCPCS

## 2024-10-23 PROCEDURE — 83036 HEMOGLOBIN GLYCOSYLATED A1C: CPT

## 2024-10-23 PROCEDURE — 80050 GENERAL HEALTH PANEL: CPT

## 2024-10-23 PROCEDURE — 86803 HEPATITIS C AB TEST: CPT

## 2024-10-23 RX ORDER — CYCLOBENZAPRINE HCL 10 MG
10 TABLET ORAL 3 TIMES DAILY PRN
Qty: 90 TABLET | Refills: 3 | Status: SHIPPED | OUTPATIENT
Start: 2024-10-23

## 2024-10-23 RX ORDER — NAPROXEN 500 MG/1
500 TABLET ORAL 2 TIMES DAILY WITH MEALS
Qty: 60 TABLET | Refills: 5 | Status: SHIPPED | OUTPATIENT
Start: 2024-10-23

## 2024-10-23 NOTE — PATIENT INSTRUCTIONS
"Hypertension, Adult  High blood pressure (hypertension) is when the force of blood pumping through the arteries is too strong. The arteries are the blood vessels that carry blood from the heart throughout the body. Hypertension forces the heart to work harder to pump blood and may cause arteries to become narrow or stiff. Untreated or uncontrolled hypertension can lead to a heart attack, heart failure, a stroke, kidney disease, and other problems.  A blood pressure reading consists of a higher number over a lower number. Ideally, your blood pressure should be below 120/80. The first (\"top\") number is called the systolic pressure. It is a measure of the pressure in your arteries as your heart beats. The second (\"bottom\") number is called the diastolic pressure. It is a measure of the pressure in your arteries as the heart relaxes.  What are the causes?  The exact cause of this condition is not known. There are some conditions that result in high blood pressure.  What increases the risk?  Certain factors may make you more likely to develop high blood pressure. Some of these risk factors are under your control, including:  Smoking.  Not getting enough exercise or physical activity.  Being overweight.  Having too much fat, sugar, calories, or salt (sodium) in your diet.  Drinking too much alcohol.  Other risk factors include:  Having a personal history of heart disease, diabetes, high cholesterol, or kidney disease.  Stress.  Having a family history of high blood pressure and high cholesterol.  Having obstructive sleep apnea.  Age. The risk increases with age.  What are the signs or symptoms?  High blood pressure may not cause symptoms. Very high blood pressure (hypertensive crisis) may cause:  Headache.  Fast or irregular heartbeats (palpitations).  Shortness of breath.  Nosebleed.  Nausea and vomiting.  Vision changes.  Severe chest pain, dizziness, and seizures.  How is this diagnosed?  This condition is diagnosed by " measuring your blood pressure while you are seated, with your arm resting on a flat surface, your legs uncrossed, and your feet flat on the floor. The cuff of the blood pressure monitor will be placed directly against the skin of your upper arm at the level of your heart. Blood pressure should be measured at least twice using the same arm. Certain conditions can cause a difference in blood pressure between your right and left arms.  If you have a high blood pressure reading during one visit or you have normal blood pressure with other risk factors, you may be asked to:  Return on a different day to have your blood pressure checked again.  Monitor your blood pressure at home for 1 week or longer.  If you are diagnosed with hypertension, you may have other blood or imaging tests to help your health care provider understand your overall risk for other conditions.  How is this treated?  This condition is treated by making healthy lifestyle changes, such as eating healthy foods, exercising more, and reducing your alcohol intake. You may be referred for counseling on a healthy diet and physical activity.  Your health care provider may prescribe medicine if lifestyle changes are not enough to get your blood pressure under control and if:  Your systolic blood pressure is above 130.  Your diastolic blood pressure is above 80.  Your personal target blood pressure may vary depending on your medical conditions, your age, and other factors.  Follow these instructions at home:  Eating and drinking    Eat a diet that is high in fiber and potassium, and low in sodium, added sugar, and fat. An example of this eating plan is called the DASH diet. DASH stands for Dietary Approaches to Stop Hypertension. To eat this way:  Eat plenty of fresh fruits and vegetables. Try to fill one half of your plate at each meal with fruits and vegetables.  Eat whole grains, such as whole-wheat pasta, brown rice, or whole-grain bread. Fill about one  fourth of your plate with whole grains.  Eat or drink low-fat dairy products, such as skim milk or low-fat yogurt.  Avoid fatty cuts of meat, processed or cured meats, and poultry with skin. Fill about one fourth of your plate with lean proteins, such as fish, chicken without skin, beans, eggs, or tofu.  Avoid pre-made and processed foods. These tend to be higher in sodium, added sugar, and fat.  Reduce your daily sodium intake. Many people with hypertension should eat less than 1,500 mg of sodium a day.  Do not drink alcohol if:  Your health care provider tells you not to drink.  You are pregnant, may be pregnant, or are planning to become pregnant.  If you drink alcohol:  Limit how much you have to:  0-1 drink a day for women.  0-2 drinks a day for men.  Know how much alcohol is in your drink. In the [place] one drink equals one 12 oz bottle of beer (355 mL), one 5 oz glass of wine (148 mL), or one 1½ oz glass of hard liquor (44 mL).  Lifestyle    Work with your health care provider to maintain a healthy body weight or to lose weight. Ask what an ideal weight is for you.  Get at least 30 minutes of exercise that causes your heart to beat faster (aerobic exercise) most days of the week. Activities may include walking, swimming, or biking.  Include exercise to strengthen your muscles (resistance exercise), such as Pilates or lifting weights, as part of your weekly exercise routine. Try to do these types of exercises for 30 minutes at least 3 days a week.  Do not use any products that contain nicotine or tobacco. These products include cigarettes, chewing tobacco, and vaping devices, such as e-cigarettes. If you need help quitting, ask your health care provider.  Monitor your blood pressure at home as told by your health care provider.  Keep all follow-up visits. This is important.  Medicines  Take over-the-counter and prescription medicines only as told by your health care provider. Follow directions carefully. Blood  pressure medicines must be taken as prescribed.  Do not skip doses of blood pressure medicine. Doing this puts you at risk for problems and can make the medicine less effective.  Ask your health care provider about side effects or reactions to medicines that you should watch for.  Contact a health care provider if you:  Think you are having a reaction to a medicine you are taking.  Have headaches that keep coming back (recurring).  Feel dizzy.  Have swelling in your ankles.  Have trouble with your vision.  Get help right away if you:  Develop a severe headache or confusion.  Have unusual weakness or numbness.  Feel faint.  Have severe pain in your chest or abdomen.  Vomit repeatedly.  Have trouble breathing.  These symptoms may be an emergency. Get help right away. Call 911.  Do not wait to see if the symptoms will go away.  Do not drive yourself to the hospital.  Summary  Hypertension is when the force of blood pumping through your arteries is too strong. If this condition is not controlled, it may put you at risk for serious complications.  Your personal target blood pressure may vary depending on your medical conditions, your age, and other factors. For most people, a normal blood pressure is less than 120/80.  Hypertension is treated with lifestyle changes, medicines, or a combination of both. Lifestyle changes include losing weight, eating a healthy, low-sodium diet, exercising more, and limiting alcohol.  This information is not intended to replace advice given to you by your health care provider. Make sure you discuss any questions you have with your health care provider.  Document Revised: 10/25/2022 Document Reviewed: 10/25/2022  Elsevier Patient Education © 2024 [employer]  BMI for Adults  Body mass index (BMI) is a number found using a person's weight and height. BMI can help tell how much of a person's weight is made up of fat. BMI does not measure body fat directly. It is used instead of tests that  "directly measure body fat, which can be difficult and expensive.  What are BMI measurements used for?  BMI is useful to:  Find out if your weight puts you at higher risk for medical problems.  Help recommend changes, such as in diet and exercise. This can help you reach a healthy weight. BMI screening can be done again to see if these changes are working.  How is BMI calculated?  Your height and weight are measured. The BMI is found from those numbers. This can be done with [place] or metric measurements. Note that charts and online BMI calculators are available to help you find your BMI quickly and easily without doing these calculations.  To calculate your BMI in [place] measurements:  Measure your weight in pounds (lb).  Multiply the number of pounds by 703.  So, for an adult who weighs 150 lb, multiply that number by 703: 150 x 703, which equals 105,450.  Measure your height in inches. Then multiply that number by itself to get a measurement called \"inches squared.\"  So, for an adult who is 70 inches tall, the \"inches squared\" measurement is 70 inches x 70 inches, which equals 4,900 inches squared.  Divide the total from step 2 (number of lb x 703) by the total from step 3 (inches squared): 105,450 ÷ 4,900 = 21.5. This is your BMI.  To calculate your BMI in metric measurements:    Measure your weight in kilograms (kg).  For this example, the weight is 70 kg.  Measure your height in meters (m). Then multiply that number by itself to get a measurement called \"meters squared.\"  So, for an adult who is 1.75 m tall, the \"meters squared\" measurement is 1.75 m x 1.75 m, which equals 3.1 meters squared.  Divide the number of kilograms (your weight) by the meters squared number. In this example: 70 ÷ 3.1 = 22.6. This is your BMI.  What do the results mean?  BMI charts are used to see if you are underweight, normal weight, overweight, or obese. The following guidelines will be used:  Underweight: BMI less than 18.5.  Normal " weight: BMI between 18.5 and 24.9.  Overweight: BMI between 25 and 29.9.  Obese: BMI of 30 or above.  BMI is a tool and cannot diagnose a condition. Talk with your health care provider about what your BMI means for you. Keep these notes in mind:  Weight includes fat and muscle. Someone with a muscular build, such as an athlete, may have a BMI that is higher than 24.9. In cases like these, BMI is not a correct measure of body fat.  If you have a BMI of 25 or higher, your provider may need to do more testing to find out if excess body fat is the cause.  BMI is measured the same way for males and females. Females usually have more body fat than males of the same height and weight.  Where to find more information  For more information about BMI, including tools to quickly find your BMI, go to:  Centers for Disease Control and Prevention: cdc.gov  American Heart Association: heart.org  National Heart, Lung, and Blood [place]: nhlbi.nih.gov  This information is not intended to replace advice given to you by your health care provider. Make sure you discuss any questions you have with your health care provider.  Document Revised: 09/07/2023 Document Reviewed: 08/31/2023  Elsevier Patient Education © 2024 [employer] Inc.

## 2024-10-23 NOTE — LETTER
October 23, 2024     Patient: Roly Mercedes   YOB: 1975   Date of Visit: 10/23/2024       To Whom It May Concern:    Roly Mercedes was seen in my clinic on 10/23/2024. He should remain out of work for continuation of care for ongoing medical services for back and hip pain until follow-up appointment on 2/1/2025 at which point he will be re-examined.          Sincerely,        Roldan More MD    CC: No Recipients

## 2024-10-23 NOTE — ASSESSMENT & PLAN NOTE
Patient's (Body mass index is 36.7 kg/m².) indicates that they are morbidly/severely obese (BMI > 40 or > 35 with obesity - related health condition) with health conditions that include GERD and osteoarthritis . Weight is worsening. BMI  is above average; BMI management plan is completed. We discussed low calorie, low carb based diet program, portion control, and increasing exercise.   
The patient is here for health maintenance visit.  Currently, the patient consumes a unhealthy diet and has an inadequate exercise regimen.  Screening lab work is ordered.  Immunizations were reviewed today.  Advice and education was given regarding nutrition, aerobic exercise, routine dental evaluations, routine eye exams, reproductive health, cardiovascular risk reduction, sunscreen use, self skin examination (annual dermatology evaluations) and seatbelt use (general overall safety).  Further recommendations will be given if needed after lab evaluation.  Annual wellness evaluation is recommended.    
No

## 2024-10-23 NOTE — PROGRESS NOTES
Roly Mercedes is a 49 y.o. male who presents today to complete annual exam.    Chief Complaint   Patient presents with    Annual Exam        Patient is here for annual exam. He has been trying walking, cycling, and stretching but is limited by his back pain. He eats a varied diet but could make healthier food choices. He sees the dentist twice a year. He sees the optometrist once a year. He has not seen dermatologist. He is still struggling with back and hip pain and would like to see a provider in [place] to assess back and hips further.            Review of Systems   Constitutional:  Negative for fever and unexpected weight loss.   HENT:  Negative for congestion, ear pain and sore throat.    Eyes:  Negative for visual disturbance.   Respiratory:  Negative for cough, shortness of breath and wheezing.    Cardiovascular:  Negative for chest pain and palpitations.   Gastrointestinal:  Negative for abdominal pain, blood in stool, constipation, diarrhea, nausea, vomiting and GERD.   Endocrine: Negative for polydipsia and polyuria.   Genitourinary:  Negative for difficulty urinating.   Musculoskeletal:  Positive for arthralgias (right hip pain) and back pain. Negative for joint swelling.   Skin:  Negative for rash and skin lesions.   Allergic/Immunologic: Negative for environmental allergies.   Neurological:  Negative for seizures, syncope and numbness.   Hematological:  Does not bruise/bleed easily.   Psychiatric/Behavioral:  Negative for suicidal ideas.             4/17/2024     9:53 AM   PHQ-2/PHQ-9 Depression Screening   Retired Little Interest or Pleasure in Doing Things 0-->not at all   Retired Feeling Down, Depressed or Hopeless 0-->not at all   Retired PHQ-9: Brief Depression Severity Measure Score 0           Past Medical History:   Diagnosis Date    Anxiety     Arthritis     Depression         Past Surgical History:   Procedure Laterality Date    BACK SURGERY      S1- L5    VASECTOMY          Family History  "  Problem Relation Age of Onset    Other Mother         unknown    Diabetes Father     No Known Problems Brother     Other Maternal Grandmother         unknown    Other Maternal Grandfather         unknown    Other Paternal Grandmother         unknown    Other Paternal Grandfather         unknown    No Known Problems Son     No Known Problems Son         Social History     Socioeconomic History    Marital status:    Tobacco Use    Smoking status: Never     Passive exposure: Never    Smokeless tobacco: Current     Types: Snuff   Vaping Use    Vaping status: Never Used   Substance and Sexual Activity    Alcohol use: Never    Drug use: Never    Sexual activity: Yes     Partners: Female     Birth control/protection: Vasectomy     Comment: 1 female partner in the last year        Current Outpatient Medications on File Prior to Visit   Medication Sig Dispense Refill    albuterol sulfate  (90 Base) MCG/ACT inhaler Inhale 2 puffs Every 4 (Four) Hours As Needed for Wheezing.      Pataday 0.1 % ophthalmic solution Administer 1 drop to both eyes 2 (Two) Times a Day. 5 mL 3    Pepcid 40 MG tablet Take 1 tablet every day by oral route as needed.      sertraline (ZOLOFT) 50 MG tablet Take 1 tablet by mouth Daily.      [DISCONTINUED] naproxen (Naprosyn) 500 MG tablet Take 1 tablet by mouth 2 (Two) Times a Day With Meals. 60 tablet 5    [DISCONTINUED] cyclobenzaprine (FLEXERIL) 5 MG tablet Take 1 tablet by mouth 3 (Three) Times a Day As Needed for Muscle Spasms. (Patient not taking: Reported on 10/23/2024) 90 tablet 3     No current facility-administered medications on file prior to visit.       Allergies   Allergen Reactions    Sulfa Antibiotics Other (See Comments)        Visit Vitals  /90 (BP Location: Left arm, Patient Position: Sitting, Cuff Size: Large Adult)   Pulse 62   Temp 98.9 °F (37.2 °C) (Infrared)   Ht 182.9 cm (72\")   Wt 123 kg (270 lb 9.6 oz)   SpO2 97%   BMI 36.70 kg/m²      Body mass index is " 36.7 kg/m².    Physical Exam  Constitutional:       General: He is not in acute distress.     Appearance: He is well-developed. He is not diaphoretic.   HENT:      Head: Atraumatic.   Cardiovascular:      Rate and Rhythm: Normal rate and regular rhythm.      Heart sounds: Normal heart sounds. No murmur heard.     No friction rub. No gallop.   Pulmonary:      Effort: Pulmonary effort is normal. No respiratory distress.      Breath sounds: Normal breath sounds. No stridor. No wheezing, rhonchi or rales.   Abdominal:      General: Bowel sounds are normal. There is no distension.      Palpations: Abdomen is soft. There is no mass.      Tenderness: There is no abdominal tenderness. There is no guarding or rebound.      Hernia: No hernia is present.   Musculoskeletal:      Cervical back: Normal range of motion and neck supple.   Skin:     General: Skin is warm and dry.   Neurological:      Mental Status: He is alert and oriented to person, place, and time.   Psychiatric:         Behavior: Behavior normal.          Results for orders placed or performed in visit on 04/17/24   Comprehensive Metabolic Panel    Collection Time: 04/17/24 11:17 AM    Specimen: Blood   Result Value Ref Range    Glucose 99 65 - 99 mg/dL    BUN 13 6 - 20 mg/dL    Creatinine 0.93 0.76 - 1.27 mg/dL    Sodium 140 136 - 145 mmol/L    Potassium 4.1 3.5 - 5.2 mmol/L    Chloride 104 98 - 107 mmol/L    CO2 24.6 22.0 - 29.0 mmol/L    Calcium 9.2 8.6 - 10.5 mg/dL    Total Protein 7.4 6.0 - 8.5 g/dL    Albumin 4.4 3.5 - 5.2 g/dL    ALT (SGPT) 31 1 - 41 U/L    AST (SGOT) 29 1 - 40 U/L    Alkaline Phosphatase 98 39 - 117 U/L    Total Bilirubin 0.5 0.0 - 1.2 mg/dL    Globulin 3.0 gm/dL    A/G Ratio 1.5 g/dL    BUN/Creatinine Ratio 14.0 7.0 - 25.0    Anion Gap 11.4 5.0 - 15.0 mmol/L    eGFR 100.7 >60.0 mL/min/1.73        Problems Addressed this Visit          Endocrine and Metabolic    Class 2 obesity due to excess calories without serious comorbidity with body  mass index (BMI) of 36.0 to 36.9 in adult     Patient's (Body mass index is 36.7 kg/m².) indicates that they are morbidly/severely obese (BMI > 40 or > 35 with obesity - related health condition) with health conditions that include GERD and osteoarthritis . Weight is worsening. BMI  is above average; BMI management plan is completed. We discussed low calorie, low carb based diet program, portion control, and increasing exercise.          Relevant Orders    Comprehensive Metabolic Panel    TSH Rfx On Abnormal To Free T4    CBC & Differential    Lipid Panel    Hemoglobin A1c       Genitourinary and Reproductive     Prostate cancer screening    Relevant Orders    PSA Screen       Health Encounters    Well adult exam - Primary     The patient is here for health maintenance visit.  Currently, the patient consumes a unhealthy diet and has an inadequate exercise regimen.  Screening lab work is ordered.  Immunizations were reviewed today.  Advice and education was given regarding nutrition, aerobic exercise, routine dental evaluations, routine eye exams, reproductive health, cardiovascular risk reduction, sunscreen use, self skin examination (annual dermatology evaluations) and seatbelt use (general overall safety).  Further recommendations will be given if needed after lab evaluation.  Annual wellness evaluation is recommended.           Relevant Orders    Comprehensive Metabolic Panel    TSH Rfx On Abnormal To Free T4    CBC & Differential    Lipid Panel    PSA Screen    Hemoglobin A1c    Hepatitis C Antibody       Musculoskeletal and Injuries    Osteoarthritis of right hip    Relevant Medications    naproxen (Naprosyn) 500 MG tablet    Other Relevant Orders    Ambulatory Referral to Orthopedic Surgery    Chronic bilateral low back pain with left-sided sciatica    Relevant Medications    cyclobenzaprine (FLEXERIL) 10 MG tablet    naproxen (Naprosyn) 500 MG tablet    Other Relevant Orders    Ambulatory Referral to [facility]     Diagnoses         Codes Comments    Well adult exam    -  Primary ICD-10-CM: Z00.00  ICD-9-CM: V70.0     Class 2 obesity due to excess calories without serious comorbidity with body mass index (BMI) of 36.0 to 36.9 in adult     ICD-10-CM: E66.812, E66.09, Z68.36  ICD-9-CM: 278.00, V85.36     Prostate cancer screening     ICD-10-CM: Z12.5  ICD-9-CM: V76.44     Chronic bilateral low back pain with left-sided sciatica     ICD-10-CM: M54.42, G89.29  ICD-9-CM: 724.2, 724.3, 338.29     Primary osteoarthritis of right hip     ICD-10-CM: M16.11  ICD-9-CM: 715.15             Return in about 3 months (around 1/23/2025) for Follow-up HTN, back pain, hip pain.    Roldan More MD  10/23/2024

## 2024-11-06 ENCOUNTER — TELEPHONE (OUTPATIENT)
Dept: FAMILY MEDICINE CLINIC | Facility: CLINIC | Age: 49
End: 2024-11-06
Payer: COMMERCIAL

## 2024-11-06 ENCOUNTER — OFFICE VISIT (OUTPATIENT)
Age: 49
End: 2024-11-06
Payer: COMMERCIAL

## 2024-11-06 VITALS
DIASTOLIC BLOOD PRESSURE: 94 MMHG | SYSTOLIC BLOOD PRESSURE: 152 MMHG | WEIGHT: 270.3 LBS | HEIGHT: 72 IN | BODY MASS INDEX: 36.61 KG/M2

## 2024-11-06 DIAGNOSIS — M25.551 BILATERAL HIP PAIN: ICD-10-CM

## 2024-11-06 DIAGNOSIS — M16.0 BILATERAL PRIMARY OSTEOARTHRITIS OF HIP: ICD-10-CM

## 2024-11-06 DIAGNOSIS — M25.551 RIGHT HIP PAIN: Primary | ICD-10-CM

## 2024-11-06 DIAGNOSIS — M25.552 BILATERAL HIP PAIN: ICD-10-CM

## 2024-11-06 RX ORDER — BUPIVACAINE HYDROCHLORIDE 5 MG/ML
2 INJECTION, SOLUTION EPIDURAL; INTRACAUDAL
Status: COMPLETED | OUTPATIENT
Start: 2024-11-06 | End: 2024-11-06

## 2024-11-06 RX ORDER — LIDOCAINE HYDROCHLORIDE 10 MG/ML
2 INJECTION, SOLUTION EPIDURAL; INFILTRATION; INTRACAUDAL; PERINEURAL
Status: COMPLETED | OUTPATIENT
Start: 2024-11-06 | End: 2024-11-06

## 2024-11-06 RX ORDER — TRIAMCINOLONE ACETONIDE 40 MG/ML
80 INJECTION, SUSPENSION INTRA-ARTICULAR; INTRAMUSCULAR
Status: COMPLETED | OUTPATIENT
Start: 2024-11-06 | End: 2024-11-06

## 2024-11-06 RX ADMIN — LIDOCAINE HYDROCHLORIDE 2 ML: 10 INJECTION, SOLUTION EPIDURAL; INFILTRATION; INTRACAUDAL; PERINEURAL at 09:36

## 2024-11-06 RX ADMIN — BUPIVACAINE HYDROCHLORIDE 2 ML: 5 INJECTION, SOLUTION EPIDURAL; INTRACAUDAL at 09:36

## 2024-11-06 RX ADMIN — TRIAMCINOLONE ACETONIDE 80 MG: 40 INJECTION, SUSPENSION INTRA-ARTICULAR; INTRAMUSCULAR at 09:36

## 2024-11-06 NOTE — TELEPHONE ENCOUNTER
[employer]    Called patient and left message for patient to return our call.    Please let the patient know that labs that were drawn at previous visit were stable so hemoglobin A1c which was elevated at 6.1, and LDL cholesterol which is elevated at 128.  Elevated A1c of 6.1 puts him in the prediabetic range and increased risk of developing diabetes.  For the elevated hemoglobin A1c as well as the elevated LDL cholesterol I would recommend diet and lifestyle modifications which include avoidance of foods that are high in fat, sugar, and carbohydrates.  Focus on trying to eat leafy green vegetables, foods that are high in fiber, and lean meats.  Patient should also try to get at least 150 minutes of cardiovascular exercise weekly. Patient should otherwise continue current treatment plan.  If patient has any questions they can contact me.

## 2024-11-06 NOTE — PROGRESS NOTES
Procedure   - Large Joint Arthrocentesis: bilateral hip joint on 11/6/2024 9:36 AM  Indications: pain  Details: 22 G (Spinal ) needle, ultrasound-guided anterolateral approach  Medications (Right): 2 mL bupivacaine (PF) 0.5 %; 2 mL lidocaine PF 1% 1 %; 80 mg triamcinolone acetonide 40 MG/ML  Medications (Left): 2 mL bupivacaine (PF) 0.5 %; 2 mL lidocaine PF 1% 1 %; 80 mg triamcinolone acetonide 40 MG/ML  Outcome: tolerated well, no immediate complications  Procedure, treatment alternatives, risks and benefits explained, specific risks discussed. Consent was given by the patient. Immediately prior to procedure a time out was called to verify the correct patient, procedure, equipment, support staff and site/side marked as required. Patient was prepped and draped in the usual sterile fashion.

## 2024-11-06 NOTE — PROGRESS NOTES
[facility] [place]     Office Visit       Date: 11/06/2024   Patient Name: Roly Mercedes  MRN: [id number]  YOB: 1975    Referring Physician: Roldan More MD     Chief Complaint:   Chief Complaint   Patient presents with    Right Hip - Pain       History of Present Illness:   Roly Mercedes is a 49 y.o. male who presents with right hip pain for 2 year(s). Onset atraumatic and gradual in nature. Pain is localized to groin and is a 6/10 on the pain scale.Pain is described as stabbing and shooting. Associated symptoms include pain, stiffness, and giving way/buckling.  The pain is worse with walking, standing, sitting, climbing stairs, sleeping, and rising from seated position; nothing improve the pain. Previous treatments have included: physical therapy and steroid injection (last injection 6/24/24) since symptom onset. Although some transient relief was reported with these interventions, these conservative measures have failed and symptoms have persisted. The patient is limited in daily activities and has had a significant decrease in quality of life as a result. He denies fevers, chills, or constitutional symptoms.    Subjective   Review of Systems: Review of Systems   Constitutional:  Negative for chills, fever, unexpected weight gain and unexpected weight loss.   HENT:  Negative for congestion, postnasal drip and rhinorrhea.    Eyes:  Negative for blurred vision.   Respiratory:  Negative for shortness of breath.    Cardiovascular:  Negative for leg swelling.   Gastrointestinal:  Negative for abdominal pain, nausea and vomiting.   Genitourinary:  Negative for difficulty urinating.   Musculoskeletal:  Positive for arthralgias. Negative for gait problem, joint swelling and myalgias.   Skin:  Negative for skin lesions and wound.   Neurological:  Negative for dizziness, weakness, light-headedness and numbness.   Hematological:  Does not  bruise/bleed easily.   Psychiatric/Behavioral:  Negative for depressed mood.         I have reviewed the following portions of the patient's history:History of Present Illness and review of systems.    Past Medical History:   Past Medical History:   Diagnosis Date    Acromioclavicular separation     Ankle sprain     Anxiety     Arthritis     Arthritis of back     Depression     Dislocation, shoulder     Fracture of wrist     Fracture, finger     Fracture, radius     Hip arthrosis     Lumbosacral disc disease     Rotator cuff syndrome        Past Surgical History:   Past Surgical History:   Procedure Laterality Date    BACK SURGERY      S1- L5    HAND SURGERY      TRIGGER POINT INJECTION      VASECTOMY         Family History:   Family History   Problem Relation Age of Onset    Diabetes Father     No Known Problems Brother     No Known Problems Son     No Known Problems Son        Social History:   Social History     Socioeconomic History    Marital status:    Tobacco Use    Smoking status: Never     Passive exposure: Never    Smokeless tobacco: Current     Types: Snuff   Vaping Use    Vaping status: Never Used   Substance and Sexual Activity    Alcohol use: Not Currently    Drug use: Never    Sexual activity: Yes     Partners: Female     Birth control/protection: Vasectomy     Comment: 1 female partner in the last year       Medications:   Current Outpatient Medications:     albuterol sulfate  (90 Base) MCG/ACT inhaler, Inhale 2 puffs Every 4 (Four) Hours As Needed for Wheezing., Disp: , Rfl:     cyclobenzaprine (FLEXERIL) 10 MG tablet, Take 1 tablet by mouth 3 (Three) Times a Day As Needed for Muscle Spasms., Disp: 90 tablet, Rfl: 3    naproxen (Naprosyn) 500 MG tablet, Take 1 tablet by mouth 2 (Two) Times a Day With Meals., Disp: 60 tablet, Rfl: 5    Pataday 0.1 % ophthalmic solution, Administer 1 drop to both eyes 2 (Two) Times a Day., Disp: 5 mL, Rfl: 3    Pepcid 40 MG tablet, Take 1 tablet every  "day by oral route as needed., Disp: , Rfl:     sertraline (ZOLOFT) 50 MG tablet, Take 1 tablet by mouth Daily., Disp: , Rfl:     Allergies:   Allergies   Allergen Reactions    Sulfa Antibiotics Other (See Comments)       I reviewed the patient's chief complaint, history of present illness, review of systems, past medical history, surgical history, family history, social history, medications and allergy list.     Objective    Vital Signs:   Vitals:    11/06/24 0859   BP: 152/94   Weight: 123 kg (270 lb 4.8 oz)   Height: 182.9 cm (72\")     Body mass index is 36.66 kg/m².   Class 2 Severe Obesity (BMI >=35 and <=39.9). Obesity-related health conditions include the following: hypertension, coronary heart disease, diabetes mellitus, and impaired fasting glucose. Obesity is newly identified. BMI is is above average; BMI management plan is completed. We discussed portion control and increasing exercise.      Patient reports that he is a non-smoker and has not ever been a smoker.  This behavior was applauded and he was encouraged to continue in smoking cessation.  We will continue to monitor at subsequent visits.    Ortho Exam:  Gait and Station: Appearance: antalgic gait.   Cardiovascular System: Arterial Pulses Right: dorsalis pedis pulse normal. Varicosities Right: capillary refill test normal.   Lumbar Spine: Inspection: normal alignment.   Hip/Pelvis Appearance: Inspection: normal axial alignment.   Hips: Bony Palpation Right: no tenderness of the greater trochanter. Soft Tissue Palpation Right: tenderness of the hip flexor muscles. Active Range of Motion Right: limited (secondary to pain especially flexion and rotation). Strength Right: normal 5/5.   +FADIR.  Skin: Right Lower Extremity: normal. Left Lower Extremity: normal.   Neurologic: Sensation on the Right: L1 normal, L2 normal, L3 normal, L4 normal, and S2 normal. Sensation on the Left: L1 normal, L2 normal, L3 normal, L4 normal, and S2 normal.    Results " Review:   Imaging Results (Last 24 Hours)       Procedure Component Value Units Date/Time    US Guided Injection [[id number]] Resulted: 11/06/24 1119     Updated: 11/06/24 1119    Narrative:      US GUIDED RIGHT HIP JOINT CORTICOSTEROID INJECTION    US Guided Injection [[id number]] Resulted: 11/06/24 1119     Updated: 11/06/24 1119    Narrative:      US GUIDED LEFT HIP JOINT CORTICOSTEROID INJECTION    XR Hip With or Without Pelvis 2 - 3 View Right [[id number]] Resulted: 11/06/24 0919     Updated: 11/06/24 0919    Narrative:      Indication: Right hip pain.     Views: AP pelvis and lateral view of the hip are submitted.    Impression:  There are no acute findings. There is no fracture subluxation   or dislocation. Advanced degenerative changes are noted in the hip joint   with significant osteophytes at the femoral head neck junction.    Comparison: No comparison images available.               Procedures    Assessment / Plan    Assessment/Plan:   Diagnoses and all orders for this visit:    1. Right hip pain (Primary)  -     XR Hip With or Without Pelvis 2 - 3 View Right  -     US Guided Injection    2. Bilateral hip pain  -     US Guided Injection    Other orders  -     - Large Joint Arthrocentesis: bilateral hip joint    Patient presents today with bilateral hip pain worse on the right compared to left.  He localizes pain to the anterior hip and groin and both hips.  Symptoms are reproducible on physical exam with intra-articular testing.  Radiographs of the right hip show moderate degenerative changes of the right hip and more mild symptoms in the left.  We reviewed his radiographic findings in detail.  Discussed treatment with him from a nonoperative standpoint.  We also discussed that total hip arthroplasty may be needed in his future depending on his response to initial treatments.  Continue with cyclobenzaprine and naproxen as needed for pain control.  Will inject both hips with corticosteroid today under  ultrasound guidance.  He will weight-bear and do physical activities as desired/tolerated.  Follow-up as needed.    Previous documentation reviewed: 4/5/2024-office visit-Majo ESTEVEZ.  10/23/2024-office visit-Clemente Aguilar MD.    Previous imaging studies reviewed: 11/27/2023-MRI right hip.    Follow Up:   Return if symptoms worsen or fail to improve.      Michael Jackson MD  [facility]

## 2024-11-21 ENCOUNTER — TELEPHONE (OUTPATIENT)
Dept: PAIN MEDICINE | Facility: CLINIC | Age: 49
End: 2024-11-21
Payer: COMMERCIAL

## 2025-01-08 ENCOUNTER — OFFICE VISIT (OUTPATIENT)
Dept: PAIN MEDICINE | Facility: CLINIC | Age: 50
End: 2025-01-08
Payer: COMMERCIAL

## 2025-01-08 VITALS — WEIGHT: 273 LBS | HEIGHT: 72 IN | BODY MASS INDEX: 36.98 KG/M2

## 2025-01-08 DIAGNOSIS — M54.16 LUMBAR RADICULOPATHY: Primary | ICD-10-CM

## 2025-01-08 PROCEDURE — 99204 OFFICE O/P NEW MOD 45 MIN: CPT | Performed by: STUDENT IN AN ORGANIZED HEALTH CARE EDUCATION/TRAINING PROGRAM

## 2025-01-08 RX ORDER — METHYLPREDNISOLONE 4 MG/1
TABLET ORAL
Qty: 21 TABLET | Refills: 0 | Status: SHIPPED | OUTPATIENT
Start: 2025-01-08

## 2025-01-08 RX ORDER — METHYLPREDNISOLONE 4 MG/1
TABLET ORAL
Qty: 21 TABLET | Refills: 0 | Status: SHIPPED | OUTPATIENT
Start: 2025-01-08 | End: 2025-01-08

## 2025-01-08 NOTE — PROGRESS NOTES
Referring Physician: Roldan More MD  [facility] [facility]  [address] [place] [id number]    Primary Physician: Roldan More MD    CHIEF COMPLAINT or REASON FOR VISIT: Back Pain (New patient)      Initial history of present illness on 01/08/2025:  Mr. Roly Mercedes is 49 y.o. male who presents as a new patient referral for evaluation treatment of chronic low back pain with radiation into the right lower extremity.  Pain has been present for several years without any specific inciting event or trauma.  He did previously undergo a lumbar discectomy.  He additionally complains of bilateral groin pain.  He has historically been treated for these conditions at the [facility] orthopedics and interventional pain management.  He has previously undergone intra-articular hip injections most recently with Dr. Jackson orthopedics at [facility] to good effect for approximately 1 to 2 months.  With Dr. Moya at the [facility] he has previously undergone transforaminal epidural steroid injection, lumbar radiofrequency ablation without improvement.  He does not wish to take medications.  He has tried acetaminophen, NSAIDs with modest benefit.  He has tried physical therapy with modest benefit.  Patient denies any bowel or bladder dysfunction, lower extremity weakness, new onset saddle anesthesia or unexplained weight loss.  He has had trigger point injections with modest benefit.      Interval history:    Interventions:      Objective Pain Scoring:   BRIEF PAIN INVENTORY:  Total score:   Pain Score    01/08/25 1048   PainSc:   9   PainLoc: Buttocks      PHQ-2: 0  PHQ-9:    Opioid Risk Tool:         Review of Systems:   ROS negative except as otherwise noted     Past Medical History:   Past Medical History:   Diagnosis Date    Acromioclavicular separation     Ankle sprain     Anxiety     Arthritis     Arthritis of back     Depression     Dislocation, shoulder     Fracture of wrist     Fracture, finger   "   Fracture, radius     Hip arthrosis     Lumbosacral disc disease     Rotator cuff syndrome          Past Surgical History:   Past Surgical History:   Procedure Laterality Date    BACK SURGERY      S1- L5    HAND SURGERY      TRIGGER POINT INJECTION      VASECTOMY           Family History   Family History   Problem Relation Age of Onset    Diabetes Father     No Known Problems Brother     No Known Problems Son     No Known Problems Son          Social History   Social History     Socioeconomic History    Marital status:    Tobacco Use    Smoking status: Never     Passive exposure: Never    Smokeless tobacco: Current     Types: Snuff   Vaping Use    Vaping status: Never Used   Substance and Sexual Activity    Alcohol use: Not Currently    Drug use: Never    Sexual activity: Yes     Partners: Female     Birth control/protection: Vasectomy     Comment: 1 female partner in the last year        Medications:     Current Outpatient Medications:     albuterol sulfate  (90 Base) MCG/ACT inhaler, Inhale 2 puffs Every 4 (Four) Hours As Needed for Wheezing., Disp: , Rfl:     cyclobenzaprine (FLEXERIL) 10 MG tablet, Take 1 tablet by mouth 3 (Three) Times a Day As Needed for Muscle Spasms., Disp: 90 tablet, Rfl: 3    methylPREDNISolone (MEDROL) 4 MG dose pack, Take as directed on package instructions., Disp: 21 tablet, Rfl: 0    naproxen (Naprosyn) 500 MG tablet, Take 1 tablet by mouth 2 (Two) Times a Day With Meals., Disp: 60 tablet, Rfl: 5    Pataday 0.1 % ophthalmic solution, Administer 1 drop to both eyes 2 (Two) Times a Day., Disp: 5 mL, Rfl: 3    Pepcid 40 MG tablet, Take 1 tablet every day by oral route as needed., Disp: , Rfl:     sertraline (ZOLOFT) 50 MG tablet, Take 1 tablet by mouth Daily., Disp: , Rfl:         Physical Exam:     Vitals:    01/08/25 1048   Weight: 124 kg (273 lb)   Height: 182.9 cm (72\")   PainSc:   9   PainLoc: Buttocks        General: Alert and oriented, No acute distress.   HEENT: " Normocephalic, atraumatic.   Cardiovascular: No gross edema  Respiratory: Respirations are non-labored      Lumbar Spine:   No masses or atrophy  Range of motion - Flexion normal. Extension normal.    Facet Loading: Negative bilaterally  Facet Palpation - Nontender   Fabricio finger/Gaenslen's/Omar's/WILLA/Thigh thrust -   Straight leg raise/slump test: Negative bilaterally  Multifidus toe-touch test:    Motor Exam:      Strength: Rate on 1-5 scale Right Left    L1/2- hip flexion 5/5  5/5    L3- knee extension 5/5  5/5    L4- ankle dorsiflexion 5/5  5/5    L5- great toe extension 5/5  5/5    S1- ankle plantarflexion 5/5  5/5    Sensory Exam: Full and equal sensation to light touch throughout.    Neurologic: Cranial Nerves II-XII are grossly intact.     Psychiatric: Cooperative.   Gait: Antalgic flexed forward  Assistive Devices: None    Imaging Studies:   No results found for this or any previous visit.        Independent review of radiographic imaging: Available for interpretation is lumbar MRI dated November 27, 2023 demonstrating Modic 2 endplate changes at L5/S1; bilateral L5/S1 neuroforaminal stenosis; canal is patent; facet arthropathy worst at L4/L5 and L5/S1.    Impression & Plan:       01/08/2025: Roly Mercedes is a 49 y.o. male with past medical history significant for osteoarthritis of right hip, GERD, anxiety, asthma, depression, obesity who presents to the pain clinic for evaluation and treatment of chronic low back pain with radiation to right lower extremity.  Evaluation consistent with lumbar radiculopathy.  MRI interpretation as above.  Previously failed conservative measures.  Will send Medrol Dosepak today and schedule lumbar interlaminar epidural steroid injection.  I had a discussion with the patient regarding the risks of the procedure including bleeding, infection, damage to surrounding structures.  We discussed the potential adverse effects of corticosteroid injection including flushing  of the face, lipodystrophy, skin discoloration, elevated blood glucose, increased blood pressure.  Risks of frequent steroid administration include weight gain, hormonal changes, mood changes, osteoporosis.    Additionally briefly discussed neurosurgical referral, SCS trial.    1. Lumbar radiculopathy        PLAN:  1. Medication Recommendations: Recommend Voltaren topical, NSAIDs, Tylenol.  Can trial turmeric 500 mg twice daily if NSAID contraindicated.  -Medrol Dosepak sent today    2. Physical Therapy: Continue HEP    3. Psychological: defer    4. Complementary and alternative (CAM) Therapies:     5. Labs/Diagnostic studies: None indicated     6. Imaging: MRI independently interpreted and reviewed with patient    7. Interventions: Schedule right paramedian lumbar interlaminar epidural steroid injection (29504).  Will enter at L5/S1    8. Referrals: None indicated     9. Records: Outside pain management records reviewed, orthopedics notes reviewed    10. Lifestyle goals:    Follow-up 1 month      [facility]  Jean Irizarry MD          Quality Metrics:

## 2025-01-10 ENCOUNTER — PATIENT ROUNDING (BHMG ONLY) (OUTPATIENT)
Dept: PAIN MEDICINE | Facility: CLINIC | Age: 50
End: 2025-01-10
Payer: COMMERCIAL

## 2025-01-10 NOTE — PROGRESS NOTES
A MY-CHART MESSAGE HAS BEEN SENT TO THE PATIENT FOR PATIENT ROUNDING WITH [facility].

## 2025-01-23 ENCOUNTER — OFFICE VISIT (OUTPATIENT)
Dept: FAMILY MEDICINE CLINIC | Facility: CLINIC | Age: 50
End: 2025-01-23
Payer: COMMERCIAL

## 2025-01-23 VITALS
TEMPERATURE: 97.7 F | SYSTOLIC BLOOD PRESSURE: 160 MMHG | OXYGEN SATURATION: 98 % | BODY MASS INDEX: 36.08 KG/M2 | HEIGHT: 72 IN | DIASTOLIC BLOOD PRESSURE: 84 MMHG | HEART RATE: 60 BPM | WEIGHT: 266.4 LBS

## 2025-01-23 DIAGNOSIS — K21.9 GASTROESOPHAGEAL REFLUX DISEASE, UNSPECIFIED WHETHER ESOPHAGITIS PRESENT: ICD-10-CM

## 2025-01-23 DIAGNOSIS — G89.29 CHRONIC BILATERAL LOW BACK PAIN WITH LEFT-SIDED SCIATICA: ICD-10-CM

## 2025-01-23 DIAGNOSIS — I10 HYPERTENSION, UNSPECIFIED TYPE: Primary | ICD-10-CM

## 2025-01-23 DIAGNOSIS — M54.42 CHRONIC BILATERAL LOW BACK PAIN WITH LEFT-SIDED SCIATICA: ICD-10-CM

## 2025-01-23 DIAGNOSIS — M25.562 ACUTE PAIN OF LEFT KNEE: ICD-10-CM

## 2025-01-23 PROCEDURE — 99214 OFFICE O/P EST MOD 30 MIN: CPT | Performed by: FAMILY MEDICINE

## 2025-01-23 RX ORDER — FAMOTIDINE 40 MG/1
40 TABLET, FILM COATED ORAL NIGHTLY PRN
Qty: 90 TABLET | Refills: 3 | Status: SHIPPED | OUTPATIENT
Start: 2025-01-23

## 2025-01-23 NOTE — ASSESSMENT & PLAN NOTE
Hypertension is uncontrolled  Dietary sodium restriction.  Regular aerobic exercise.  Ambulatory blood pressure monitoring.  Patient would like to avoid medications so work on diet and exercise  Blood pressure will be reassessedin 3 months.

## 2025-01-23 NOTE — ASSESSMENT & PLAN NOTE
Patient had some improvement in back pain with a steroid Dosepak from pain management and is scheduled to go see them next week for an injection and is hoping this will help ease his pain.  Patient was encouraged to take the naproxen twice daily with meals as directed and use Tylenol and Flexeril as needed.

## 2025-01-23 NOTE — PROGRESS NOTES
Roly Mercedes is a 49 y.o. male who presents today for Hypertension, Back Pain, and Knee Pain      Patient has still be suffering from low back pain that radiates down in to the calf on the right side. He saw pain management and they gave him a steroid dose pack which eased the pain and he is scheduled for an injection next week.  He has been having pain on the left knee for three weeks. He woke up with the pain. It is worse when he squats down. He has no injury or change in activity. It has not gotten better or worse over all but he has noticed the more he uses it the worse it gets. He has tried heat, ice, flexeril, and naproxen and has not been able to get significant relief. He has no popping or locking. He has not been checking his blood pressure at home. He has not been sleeping well due to the pain.          Review of Systems   Constitutional:  Negative for fever and unexpected weight loss.   HENT:  Negative for congestion, ear pain and sore throat.    Eyes:  Negative for visual disturbance.   Respiratory:  Negative for cough, shortness of breath and wheezing.    Cardiovascular:  Negative for chest pain and palpitations.   Gastrointestinal:  Negative for abdominal pain, blood in stool, constipation, diarrhea, nausea, vomiting and GERD.   Endocrine: Negative for polydipsia and polyuria.   Genitourinary:  Negative for difficulty urinating.   Musculoskeletal:  Positive for arthralgias and back pain. Negative for joint swelling.   Skin:  Negative for rash and skin lesions.   Allergic/Immunologic: Negative for environmental allergies.   Neurological:  Negative for seizures and syncope.   Hematological:  Does not bruise/bleed easily.   Psychiatric/Behavioral:  Negative for suicidal ideas.         The following portions of the patient's history were reviewed and updated as appropriate: allergies, current medications, past family history, past medical history, past social history, past surgical history and problem  "list.    Current Outpatient Medications on File Prior to Visit   Medication Sig Dispense Refill    albuterol sulfate  (90 Base) MCG/ACT inhaler Inhale 2 puffs Every 4 (Four) Hours As Needed for Wheezing.      cyclobenzaprine (FLEXERIL) 10 MG tablet Take 1 tablet by mouth 3 (Three) Times a Day As Needed for Muscle Spasms. 90 tablet 3    naproxen (Naprosyn) 500 MG tablet Take 1 tablet by mouth 2 (Two) Times a Day With Meals. 60 tablet 5    Pataday 0.1 % ophthalmic solution Administer 1 drop to both eyes 2 (Two) Times a Day. 5 mL 3    sertraline (ZOLOFT) 50 MG tablet Take 1 tablet by mouth Daily.      [DISCONTINUED] methylPREDNISolone (MEDROL) 4 MG dose pack Take as directed on package instructions. (Patient not taking: Reported on 1/23/2025) 21 tablet 0    [DISCONTINUED] Pepcid 40 MG tablet Take 1 tablet every day by oral route as needed. (Patient not taking: Reported on 1/23/2025)       No current facility-administered medications on file prior to visit.       Allergies   Allergen Reactions    Sulfa Antibiotics Other (See Comments)        Visit Vitals  /84 (BP Location: Right arm, Patient Position: Sitting, Cuff Size: Large Adult)   Pulse 60   Temp 97.7 °F (36.5 °C) (Infrared)   Ht 182.9 cm (72\")   Wt 121 kg (266 lb 6.4 oz)   SpO2 98%   BMI 36.13 kg/m²        Physical Exam  Constitutional:       General: He is not in acute distress.     Appearance: He is well-developed. He is not diaphoretic.   HENT:      Head: Atraumatic.   Cardiovascular:      Rate and Rhythm: Normal rate and regular rhythm.      Heart sounds: Normal heart sounds. No murmur heard.     No friction rub. No gallop.   Pulmonary:      Effort: Pulmonary effort is normal. No respiratory distress.      Breath sounds: Normal breath sounds. No stridor. No wheezing, rhonchi or rales.   Musculoskeletal:      Cervical back: Normal range of motion and neck supple.      Lumbar back: Spasms and tenderness present. No swelling, deformity, lacerations " or bony tenderness. Decreased range of motion.      Right knee: Normal.      Left knee: Crepitus present. No swelling, deformity, effusion, erythema or bony tenderness. Normal range of motion. Tenderness present over the MCL. No medial joint line, lateral joint line, LCL, ACL, PCL or patellar tendon tenderness. No LCL laxity, MCL laxity, ACL laxity or PCL laxity.Normal alignment, normal meniscus and normal patellar mobility.      Instability Tests: Anterior drawer test negative. Posterior drawer test negative.   Skin:     General: Skin is warm and dry.   Neurological:      Mental Status: He is alert and oriented to person, place, and time.   Psychiatric:         Behavior: Behavior normal.          Results for orders placed or performed in visit on 10/23/24   Comprehensive Metabolic Panel    Collection Time: 10/23/24 11:17 AM    Specimen: Blood   Result Value Ref Range    Glucose 160 (H) 65 - 99 mg/dL    BUN 9 6 - 20 mg/dL    Creatinine 0.95 0.76 - 1.27 mg/dL    Sodium 138 136 - 145 mmol/L    Potassium 4.1 3.5 - 5.2 mmol/L    Chloride 102 98 - 107 mmol/L    CO2 26.4 22.0 - 29.0 mmol/L    Calcium 9.2 8.6 - 10.5 mg/dL    Total Protein 7.5 6.0 - 8.5 g/dL    Albumin 4.2 3.5 - 5.2 g/dL    ALT (SGPT) 26 1 - 41 U/L    AST (SGOT) 25 1 - 40 U/L    Alkaline Phosphatase 108 39 - 117 U/L    Total Bilirubin 0.3 0.0 - 1.2 mg/dL    Globulin 3.3 gm/dL    A/G Ratio 1.3 g/dL    BUN/Creatinine Ratio 9.5 7.0 - 25.0    Anion Gap 9.6 5.0 - 15.0 mmol/L    eGFR 97.5 >60.0 mL/min/1.73   TSH Rfx On Abnormal To Free T4    Collection Time: 10/23/24 11:17 AM    Specimen: Blood   Result Value Ref Range    TSH 3.390 0.270 - 4.200 uIU/mL   Lipid Panel    Collection Time: 10/23/24 11:17 AM    Specimen: Blood   Result Value Ref Range    Total Cholesterol 192 0 - 200 mg/dL    Triglycerides 133 0 - 150 mg/dL    HDL Cholesterol 40 40 - 60 mg/dL    LDL Cholesterol  128 (H) 0 - 100 mg/dL    VLDL Cholesterol 24 5 - 40 mg/dL    LDL/HDL Ratio 3.14    PSA  Screen    Collection Time: 10/23/24 11:17 AM    Specimen: Blood   Result Value Ref Range    PSA 1.690 0.000 - 4.000 ng/mL   Hemoglobin A1c    Collection Time: 10/23/24 11:17 AM    Specimen: Blood   Result Value Ref Range    Hemoglobin A1C 6.10 (H) 4.80 - 5.60 %   Hepatitis C Antibody    Collection Time: 10/23/24 11:17 AM    Specimen: Blood   Result Value Ref Range    Hepatitis C Ab Non-Reactive Non-Reactive   CBC Auto Differential    Collection Time: 10/23/24 11:17 AM    Specimen: Blood   Result Value Ref Range    WBC 6.65 3.40 - 10.80 10*3/mm3    RBC 5.65 4.14 - 5.80 10*6/mm3    Hemoglobin 15.3 13.0 - 17.7 g/dL    Hematocrit 46.9 37.5 - 51.0 %    MCV 83.0 79.0 - 97.0 fL    MCH 27.1 26.6 - 33.0 pg    MCHC 32.6 31.5 - 35.7 g/dL    RDW 12.7 12.3 - 15.4 %    RDW-SD 37.8 37.0 - 54.0 fl    MPV 10.6 6.0 - 12.0 fL    Platelets 194 140 - 450 10*3/mm3    Neutrophil % 60.8 42.7 - 76.0 %    Lymphocyte % 28.0 19.6 - 45.3 %    Monocyte % 8.0 5.0 - 12.0 %    Eosinophil % 2.4 0.3 - 6.2 %    Basophil % 0.5 0.0 - 1.5 %    Immature Grans % 0.3 0.0 - 0.5 %    Neutrophils, Absolute 4.05 1.70 - 7.00 10*3/mm3    Lymphocytes, Absolute 1.86 0.70 - 3.10 10*3/mm3    Monocytes, Absolute 0.53 0.10 - 0.90 10*3/mm3    Eosinophils, Absolute 0.16 0.00 - 0.40 10*3/mm3    Basophils, Absolute 0.03 0.00 - 0.20 10*3/mm3    Immature Grans, Absolute 0.02 0.00 - 0.05 10*3/mm3    nRBC 0.0 0.0 - 0.2 /100 WBC        Problems Addressed this Visit          Cardiac and Vasculature    Hypertension - Primary     Hypertension is uncontrolled  Dietary sodium restriction.  Regular aerobic exercise.  Ambulatory blood pressure monitoring.  Patient would like to avoid medications so work on diet and exercise  Blood pressure will be reassessedin 3 months.            Gastrointestinal Abdominal     Gastroesophageal reflux disease     Chronic and stable.  Patient will continue Pepcid nightly as needed.         Relevant Medications    Pepcid 40 MG tablet        Musculoskeletal and Injuries    Chronic bilateral low back pain with left-sided sciatica     Patient had some improvement in back pain with a steroid Dosepak from pain management and is scheduled to go see them next week for an injection and is hoping this will help ease his pain.  Patient was encouraged to take the naproxen twice daily with meals as directed and use Tylenol and Flexeril as needed.         Acute pain of left knee     Knee pain is new and suspect secondary to MCL strain.  Patient can continue supportive measures at home and was encouraged to take naproxen twice daily with Tylenol as needed for pain.  Patient's symptoms fail to improve or worsen will obtain x-ray and refer patient to orthopedic surgery as well as physical therapy.          Diagnoses         Codes Comments    Hypertension, unspecified type    -  Primary ICD-10-CM: I10  ICD-9-CM: 401.9     Gastroesophageal reflux disease, unspecified whether esophagitis present     ICD-10-CM: K21.9  ICD-9-CM: 530.81     Chronic bilateral low back pain with left-sided sciatica     ICD-10-CM: M54.42, G89.29  ICD-9-CM: 724.2, 724.3, 338.29     Acute pain of left knee     ICD-10-CM: M25.562  ICD-9-CM: 719.46             Return in about 3 months (around 4/23/2025) for Follow-up HTN, back pain, knee pain.    Roldan More MD   1/23/2025

## 2025-01-23 NOTE — LETTER
January 23, 2025     Patient: Roly Mercedes   YOB: 1975   Date of Visit: 1/23/2025       To Whom It May Concern:    Roly Mercedes was seen in my clinic on 1/23/2025. He should remain out of work for continuation of care for ongoing medical services for back, knee, and hip pain until follow-up appointment on 4/23/2025 at which point he will be re-examined.      Sincerely,        Roldan More MD    CC: No Recipients

## 2025-01-23 NOTE — ASSESSMENT & PLAN NOTE
Knee pain is new and suspect secondary to MCL strain.  Patient can continue supportive measures at home and was encouraged to take naproxen twice daily with Tylenol as needed for pain.  Patient's symptoms fail to improve or worsen will obtain x-ray and refer patient to orthopedic surgery as well as physical therapy.

## 2025-01-28 ENCOUNTER — OUTSIDE FACILITY SERVICE (OUTPATIENT)
Dept: PAIN MEDICINE | Facility: CLINIC | Age: 50
End: 2025-01-28
Payer: COMMERCIAL

## 2025-01-28 ENCOUNTER — DOCUMENTATION (OUTPATIENT)
Dept: PAIN MEDICINE | Facility: CLINIC | Age: 50
End: 2025-01-28

## 2025-01-28 PROCEDURE — 62323 NJX INTERLAMINAR LMBR/SAC: CPT | Performed by: STUDENT IN AN ORGANIZED HEALTH CARE EDUCATION/TRAINING PROGRAM

## 2025-01-28 NOTE — PROGRESS NOTES
[facility]  [address] [place] [address]      PROCEDURE: Fluoroscopically-guided L5/S1 Lumbar Interlaminar Epidural Steroid Injection     PRE-OP DIAGNOSIS: Lumbar radiculopathy   POST-OP DIAGNOSIS: Same    BLOOD THINNERS (ANTIPLATELETS/ANTICOAGULANTS): Were discussed with the patient and EMILIANA Guidelines were followed.      CONSENT: Risks, benefits and options were explained to the patient, all questions were answered and written informed consent was obtained.     ANESTHESIA: Local Only      PROCEDURE NOTE: A pre-procedural time out was performed to confirm the correct patient, procedure, side, and site. Standard ASA monitors were applied and oxygen via nasal cannula was provided. All proceduralists donned sterile gloves with masks and surgical hats. The patient was placed prone with pillow under the abdomen and all pressure points padded. The patient's lumbar spine was prepped in standard fashion using [Chlorhexidine] and draped with sterile towels. The target lumbar interspace was identified using fluoroscopy. The overlying skin and subcutaneous tissue was anesthetized with 1% lidocaine. A 20 gauge 10 cm Tuohy needle was advanced using intermittent AP and lateral fluoroscopy. The ligamentum flavum was engaged. Access to the epidural space was gained using a loss of resistance technique to air. Needle placement was confirmed with 1 ml of Omnipaque 180 mgI/ml contrast using biplanar live fluoroscopic imaging. An epidurogram was noted without evidence of intravascular or intrathecal spread. After negative aspiration, a mixture containing 3 ml of preservative-free normal saline, dexamethasone 10mg steroid, lidocaine 1% - 2 ml local anesthetic for a total volume of 6 ml was injected under direct visualization with fluoroscopy. The Tuohy needle was flushed, removed and a bandage applied.     EBL: None     COMPLICATIONS: None     The patient tolerated the procedure well.  Vital signs were stable. Sensory and motor exam was unchanged from baseline. The patient was observed in recovery and was discharged after meeting established criteria.    FOLLOW UP: As scheduled     ADDITIONAL NOTES:      [facility]   Jean Irizarry MD      CODES:  [id number]

## 2025-02-27 ENCOUNTER — OFFICE VISIT (OUTPATIENT)
Dept: PAIN MEDICINE | Facility: CLINIC | Age: 50
End: 2025-02-27
Payer: COMMERCIAL

## 2025-02-27 VITALS — HEIGHT: 72 IN | BODY MASS INDEX: 37.25 KG/M2 | WEIGHT: 275 LBS

## 2025-02-27 DIAGNOSIS — M54.16 LUMBAR RADICULOPATHY: Primary | ICD-10-CM

## 2025-02-27 PROCEDURE — 99214 OFFICE O/P EST MOD 30 MIN: CPT

## 2025-02-27 RX ORDER — GABAPENTIN 300 MG/1
300 CAPSULE ORAL NIGHTLY
Qty: 30 CAPSULE | Refills: 0 | Status: SHIPPED | OUTPATIENT
Start: 2025-02-27

## 2025-02-27 NOTE — TELEPHONE ENCOUNTER
Start gabapentin  Gabapentin 300 mg nightly, 30 pills, #0 refills  Sheldon reviewed and compliant  Controlled substance agreement signed in office  Can obtain compliance UDS if medication is continued  Appropriate follow-up visit scheduled

## 2025-02-27 NOTE — PROGRESS NOTES
Referring Physician: No referring provider defined for this encounter.    Primary Physician: Roldan More MD    CHIEF COMPLAINT or REASON FOR VISIT: Follow-up (Post LESI) and Back Pain      Initial history of present illness on 01/08/2025:  Mr. Roly Mercedes is 49 y.o. male who presents as a new patient referral for evaluation treatment of chronic low back pain with radiation into the right lower extremity.  Pain has been present for several years without any specific inciting event or trauma.  He did previously undergo a lumbar discectomy.  He additionally complains of bilateral groin pain.  He has historically been treated for these conditions at the [facility] orthopedics and interventional pain management.  He has previously undergone intra-articular hip injections most recently with Dr. Jackson orthopedics at [facility] to good effect for approximately 1 to 2 months.  With Dr. Moya at the [facility] he has previously undergone transforaminal epidural steroid injection, lumbar radiofrequency ablation without improvement.  He does not wish to take medications.  He has tried acetaminophen, NSAIDs with modest benefit.  He has tried physical therapy with modest benefit.  Patient denies any bowel or bladder dysfunction, lower extremity weakness, new onset saddle anesthesia or unexplained weight loss.  He has had trigger point injections with modest benefit.      Interval history:  Patient returns to clinic today after undergoing a lumbar interlaminar epidural steroid injection.  He has undergone injections in the past with Dr. Moya  at [facility] without improvement.  Today, he reports good but transient relief from this injection.  He continues to complain of chronic right-sided low back pain with radiation to the right lower extremity.  Pain will radiate into the right calf.  He has previously undergone a lumbar discectomy in 2019 at [facility] and had a return of his symptoms  approximately 2.5 weeks after the procedure.  We did discuss treatment options today including medication, spinal cord stimulator trial, neurosurgical referral.    Interventions:  1/28/2025: L5/S1 LESI with 80% relief for 2 days    Objective Pain Scoring:   BRIEF PAIN INVENTORY:  Total score:   Pain Score    02/27/25 1052   PainSc: 6    PainLoc: Back        PHQ-2: 0  PHQ-9:    Opioid Risk Tool:         Review of Systems:   ROS negative except as otherwise noted     Past Medical History:   Past Medical History:   Diagnosis Date    Acromioclavicular separation     Ankle sprain     Anxiety     Arthritis     Arthritis of back     Depression     Dislocation, shoulder     Fracture of wrist     Fracture, finger     Fracture, radius     Hip arthrosis     Lumbosacral disc disease     Rotator cuff syndrome          Past Surgical History:   Past Surgical History:   Procedure Laterality Date    BACK SURGERY      S1- L5    HAND SURGERY      TRIGGER POINT INJECTION      VASECTOMY           Family History   Family History   Problem Relation Age of Onset    Diabetes Father     No Known Problems Brother     No Known Problems Son     No Known Problems Son          Social History   Social History     Socioeconomic History    Marital status:    Tobacco Use    Smoking status: Never     Passive exposure: Never    Smokeless tobacco: Current     Types: Snuff   Vaping Use    Vaping status: Never Used   Substance and Sexual Activity    Alcohol use: Not Currently    Drug use: Never    Sexual activity: Yes     Partners: Female     Birth control/protection: Vasectomy     Comment: 1 female partner in the last year        Medications:     Current Outpatient Medications:     albuterol sulfate  (90 Base) MCG/ACT inhaler, Inhale 2 puffs Every 4 (Four) Hours As Needed for Wheezing., Disp: , Rfl:     cyclobenzaprine (FLEXERIL) 10 MG tablet, Take 1 tablet by mouth 3 (Three) Times a Day As Needed for Muscle Spasms., Disp: 90 tablet, Rfl:  "3    naproxen (Naprosyn) 500 MG tablet, Take 1 tablet by mouth 2 (Two) Times a Day With Meals., Disp: 60 tablet, Rfl: 5    Pataday 0.1 % ophthalmic solution, Administer 1 drop to both eyes 2 (Two) Times a Day., Disp: 5 mL, Rfl: 3    Pepcid 40 MG tablet, Take 1 tablet by mouth At Night As Needed for Heartburn., Disp: 90 tablet, Rfl: 3    sertraline (ZOLOFT) 50 MG tablet, Take 1 tablet by mouth Daily., Disp: , Rfl:         Physical Exam:     Vitals:    02/27/25 1052   Weight: 125 kg (275 lb)   Height: 182.9 cm (72.01\")   PainSc: 6    PainLoc: Back        General: Alert and oriented, No acute distress.   HEENT: Normocephalic, atraumatic.   Cardiovascular: No gross edema  Respiratory: Respirations are non-labored      Lumbar Spine:   No masses or atrophy  Range of motion - Flexion normal. Extension normal.    Facet Loading: Negative bilaterally  Facet Palpation - Nontender   Fabricio finger/Gaenslen's/Omar's/WILLA/Thigh thrust -   Straight leg raise/slump test: Negative bilaterally  Multifidus toe-touch test:    Motor Exam:      Strength: Rate on 1-5 scale Right Left    L1/2- hip flexion 5/5  5/5    L3- knee extension 5/5  5/5    L4- ankle dorsiflexion 5/5  5/5    L5- great toe extension 5/5  5/5    S1- ankle plantarflexion 5/5  5/5    Sensory Exam: Full and equal sensation to light touch throughout.    Neurologic: Cranial Nerves II-XII are grossly intact.     Psychiatric: Cooperative.   Gait: Antalgic flexed forward  Assistive Devices: None    Imaging Studies:   No results found for this or any previous visit.        Independent review of radiographic imaging: Available for interpretation is lumbar MRI dated November 27, 2023 demonstrating Modic 2 endplate changes at L5/S1; bilateral L5/S1 neuroforaminal stenosis; canal is patent; facet arthropathy worst at L4/L5 and L5/S1.    Impression & Plan:       01/08/2025: Roly Mercedes is a 49 y.o. male with past medical history significant for osteoarthritis of right " hip, GERD, anxiety, asthma, depression, obesity who presents to the pain clinic for evaluation and treatment of chronic low back pain with radiation to right lower extremity.  Evaluation consistent with lumbar radiculopathy.  MRI interpretation as above.  Previously failed conservative measures.  Will send Medrol Dosepak today and schedule lumbar interlaminar epidural steroid injection.  I had a discussion with the patient regarding the risks of the procedure including bleeding, infection, damage to surrounding structures.  We discussed the potential adverse effects of corticosteroid injection including flushing of the face, lipodystrophy, skin discoloration, elevated blood glucose, increased blood pressure.  Risks of frequent steroid administration include weight gain, hormonal changes, mood changes, osteoporosis.    Additionally briefly discussed neurosurgical referral, SCS trial.  2/27/2025: Good but transient relief from LESI.  Will refer to neurosurgery, Dr. Lund.  Will start gabapentin nightly.  If no surgery per NSA will pursue SCS trial with [employer]    1. Lumbar radiculopathy          PLAN:  1. Medication Recommendations: Recommend Voltaren topical, NSAIDs, Tylenol.  Can trial turmeric 500 mg twice daily if NSAID contraindicated.  Start gabapentin 300 mg nightly, 30 pills, #0 refills-  As part of this patient's treatment plan, patient will be prescribed controlled substances.  The patient has been made aware of appropriate use of such medications, including potential risk of somnolent, limited ability to drive and/or work safely, and potential for dependence or overdose.  He has been made clear that his medications refused by this patient only, without concomitant use of alcohol or other substances as prescribed.  Controlled substance status of medication discussed with patient, discussed risk of medication including abuse potential and diversion potential and need to follow-up for reevaluation appointment  in order to receive further refills.  Sheldon was reviewed and compliant.       2. Physical Therapy: Continue HEP    3. Psychological: Can consider psych clearance for SCS trial    4. Complementary and alternative (CAM) Therapies:     5. Labs/Diagnostic studies: None indicated     6. Imagin. Interventions: Will pursue spinal cord stimulator trial if no surgical intervention per NSA.  Discussed in detail today.    8. Referrals: Neurosurgery, Dr. Yann Lund    9. Records: Sheldon reviewed and compliant    10. Lifestyle goals:    Follow-up 1 month      [facility]  Jesse Carvalho PA-C          Quality Metrics:

## 2025-03-21 ENCOUNTER — OFFICE VISIT (OUTPATIENT)
Dept: NEUROSURGERY | Facility: CLINIC | Age: 50
End: 2025-03-21
Payer: COMMERCIAL

## 2025-03-21 VITALS — TEMPERATURE: 97.4 F | WEIGHT: 274 LBS | HEIGHT: 72 IN | BODY MASS INDEX: 37.11 KG/M2

## 2025-03-21 DIAGNOSIS — M54.16 LUMBAR RADICULOPATHY: Primary | ICD-10-CM

## 2025-03-21 DIAGNOSIS — M16.11 PRIMARY OSTEOARTHRITIS OF RIGHT HIP: ICD-10-CM

## 2025-03-21 PROCEDURE — 99204 OFFICE O/P NEW MOD 45 MIN: CPT | Performed by: PHYSICIAN ASSISTANT

## 2025-03-21 RX ORDER — ACETAMINOPHEN 500 MG
500 TABLET ORAL EVERY 6 HOURS PRN
COMMUNITY

## 2025-03-21 NOTE — PROGRESS NOTES
Patient: Roly Mercedes  : 1975  Chart #: [id number]    Date of Service: 2025    CC: back and right leg pain      Back Pain  Associated symptoms include leg pain. Pertinent negatives include no abdominal pain, chest pain, dysuria, fever, headaches, numbness or weakness.   Leg Pain   Pertinent negatives include no numbness.     This 48 yo male presents with chronic LBP but has been having pain radiating to the right leg to the right calf. He had injections with good but transient relief. He had prior discectomy in 2019.    Chronic Illnesses:    Past Medical History:   Diagnosis Date    Acromioclavicular separation     Ankle sprain     Anxiety     Arthritis     Arthritis of back     Depression     Dislocation, shoulder     Fracture of wrist     Fracture, finger     Fracture, radius     Hip arthrosis     Lumbosacral disc disease     Rotator cuff syndrome          Past Surgical History:   Procedure Laterality Date    BACK SURGERY      S1- L5    HAND SURGERY      TRIGGER POINT INJECTION      VASECTOMY         Allergies   Allergen Reactions    Sulfa Antibiotics Other (See Comments)         Current Outpatient Medications:     acetaminophen (TYLENOL) 500 MG tablet, Take 1 tablet by mouth Every 6 (Six) Hours As Needed for Mild Pain., Disp: , Rfl:     albuterol sulfate  (90 Base) MCG/ACT inhaler, Inhale 2 puffs Every 4 (Four) Hours As Needed for Wheezing., Disp: , Rfl:     cyclobenzaprine (FLEXERIL) 10 MG tablet, Take 1 tablet by mouth 3 (Three) Times a Day As Needed for Muscle Spasms., Disp: 90 tablet, Rfl: 3    naproxen (Naprosyn) 500 MG tablet, Take 1 tablet by mouth 2 (Two) Times a Day With Meals., Disp: 60 tablet, Rfl: 5    Pataday 0.1 % ophthalmic solution, Administer 1 drop to both eyes 2 (Two) Times a Day., Disp: 5 mL, Rfl: 3    Pepcid 40 MG tablet, Take 1 tablet by mouth At Night As Needed for Heartburn., Disp: 90 tablet, Rfl: 3    sertraline (ZOLOFT) 50 MG tablet, Take 1 tablet by mouth  Daily., Disp: , Rfl:     Social History     Socioeconomic History    Marital status:    Tobacco Use    Smoking status: Never     Passive exposure: Never    Smokeless tobacco: Current     Types: Snuff   Vaping Use    Vaping status: Never Used   Substance and Sexual Activity    Alcohol use: Not Currently    Drug use: Never    Sexual activity: Yes     Partners: Female     Birth control/protection: Vasectomy     Comment: 1 female partner in the last year       Family History   Problem Relation Age of Onset    Diabetes Father     No Known Problems Brother     No Known Problems Son     No Known Problems Son                Social History    Tobacco Use      Smoking status: Never        Passive exposure: Never      Smokeless tobacco: Current        Types: Snuff       Review of Systems   Constitutional:  Negative for activity change, appetite change, chills, diaphoresis, fatigue, fever and unexpected weight change.   HENT:  Negative for congestion, dental problem, drooling, ear discharge, ear pain, facial swelling, hearing loss, mouth sores, nosebleeds, postnasal drip, rhinorrhea, sinus pressure, sinus pain, sneezing, sore throat, tinnitus, trouble swallowing and voice change.    Eyes:  Negative for photophobia, pain, discharge, redness, itching and visual disturbance.   Respiratory:  Negative for apnea, cough, choking, chest tightness, shortness of breath, wheezing and stridor.    Cardiovascular:  Negative for chest pain, palpitations and leg swelling.   Gastrointestinal:  Negative for abdominal distention, abdominal pain, anal bleeding, blood in stool, constipation, diarrhea, nausea, rectal pain and vomiting.   Endocrine: Negative for cold intolerance, heat intolerance, polydipsia, polyphagia and polyuria.   Genitourinary:  Negative for decreased urine volume, difficulty urinating, dysuria, enuresis, flank pain, frequency, genital sores, hematuria, penile discharge, penile pain, penile swelling, scrotal swelling,  "testicular pain and urgency.   Musculoskeletal:  Positive for arthralgias and back pain. Negative for gait problem, joint swelling, myalgias, neck pain and neck stiffness.        RT hip/leg pain   Skin:  Negative for color change, pallor, rash and wound.   Allergic/Immunologic: Negative for environmental allergies, food allergies and immunocompromised state.   Neurological:  Negative for dizziness, tremors, seizures, syncope, facial asymmetry, speech difficulty, weakness, light-headedness, numbness and headaches.   Hematological:  Negative for adenopathy. Does not bruise/bleed easily.   Psychiatric/Behavioral:  Negative for agitation, behavioral problems, confusion, decreased concentration, dysphoric mood, hallucinations, self-injury, sleep disturbance and suicidal ideas. The patient is not nervous/anxious and is not hyperactive.    All other systems reviewed and are negative.     Physical examination:  Temperature 97.4 °F (36.3 °C), temperature source Temporal, height 182.9 cm (72\"), weight 124 kg (274 lb).  HEENT- normocephalic, atraumatic, sclera clear  Lungs-normal expansion, no wheezing  Heart-regular rate and rhythm  Extremities-positive pulses, no edema    Neurological Exam   WDWNWM  A/A/C, speech clear, attention normal, conversant, answers questions appropriately, good historian.  Cranial nerves II through XII are intact.  Motor examination does not reveal weakness in the lower extremities.   Sensation is intact.  Gait is normal, balance is normal.   No tremors are noted.  Reflexes are intact.   Cloud is negative. Clonus is negative.   Palpation reveals pain.    Radiographic Imaging:  For my review is a MRI from 2023.    Medical Decision Making  Diagnoses and all orders for this visit:    1. Lumbar radiculopathy (Primary)  -     MRI Lumbar Spine With & Without Contrast; Future  -     XR Spine Lumbar Complete With Flex & Ext; Future    2. Primary osteoarthritis of right hip       Patient has radiculopathy. " He has responded to injection and steroids. HE REQUIRES updated MR to evaluate for surgical intervention vs further intervention. Patient is in agreement with this plan.      Any copied data from previous notes included in the (1) HPI, (2) PE, (3) MDM and/or assessment and plan has been reviewed and is accurate as of this day.    Dorota Andrade, PAC    Patient Care Team:  Roldan More MD as PCP - General (Family Medicine)  Luis Manuel Jackson MD as Surgeon (Orthopedic Surgery)

## 2025-03-23 PROBLEM — M54.16 LUMBAR RADICULOPATHY: Status: ACTIVE | Noted: 2025-03-23

## 2025-04-01 ENCOUNTER — TELEPHONE (OUTPATIENT)
Dept: NEUROSURGERY | Facility: CLINIC | Age: 50
End: 2025-04-01

## 2025-04-01 ENCOUNTER — OFFICE VISIT (OUTPATIENT)
Dept: NEUROSURGERY | Facility: CLINIC | Age: 50
End: 2025-04-01
Payer: COMMERCIAL

## 2025-04-01 VITALS
HEIGHT: 72 IN | TEMPERATURE: 96 F | WEIGHT: 275.3 LBS | BODY MASS INDEX: 37.29 KG/M2 | DIASTOLIC BLOOD PRESSURE: 80 MMHG | SYSTOLIC BLOOD PRESSURE: 130 MMHG

## 2025-04-01 DIAGNOSIS — M16.11 PRIMARY OSTEOARTHRITIS OF RIGHT HIP: ICD-10-CM

## 2025-04-01 DIAGNOSIS — M54.42 CHRONIC BILATERAL LOW BACK PAIN WITH LEFT-SIDED SCIATICA: ICD-10-CM

## 2025-04-01 DIAGNOSIS — G89.29 CHRONIC BILATERAL LOW BACK PAIN WITH LEFT-SIDED SCIATICA: ICD-10-CM

## 2025-04-01 DIAGNOSIS — M51.9 LUMBOSACRAL DISC DISEASE: Primary | ICD-10-CM

## 2025-04-01 PROCEDURE — 99214 OFFICE O/P EST MOD 30 MIN: CPT | Performed by: PHYSICIAN ASSISTANT

## 2025-04-01 NOTE — PROGRESS NOTES
Patient: Roly Mercedes  : 1975  Chart #: [id number]    Date of Service: 2025    CC: back and right leg pain      Back Pain  Associated symptoms include leg pain. Pertinent negatives include no abdominal pain, chest pain, dysuria, fever, headaches, numbness or weakness.   Leg Pain   Pertinent negatives include no numbness.     This 48 yo male presents with chronic LBP but has been having pain radiating to the right leg to the right calf. He had injections with good but transient relief. He had prior discectomy in 2019, Dr. House.  He did well after surgery.  He developed low back pain and right sided hip pain in , MRI of the hip and  showed moderate changes in the right hip, MRI of the lumbar spine revealed degenerative changes mild at L4-5 and L5-S1. He  underwent injections by Dr. Moya at the [facility] in  for his hip and lumbar spine with some improvement which was short-lived.  He again saw orthopedic surgery in 2024 with bilateral hip pain and had injections.  He was referred to Dr. Irizarry who saw him in 2025 for back and right leg pain.  He underwent an L5-S1 right intralaminar epidural steroid injection and had good but transient relief from his injection, 80% relief for 2 days.  He continues to have right-sided low back pain that radiates to the Right calf.  He did talk to pain management regarding a spinal cord stimulator but he does not really want to go that route.  He is seen for neurosurgical evaluation.  I did  order a new MRI scan since his last was from .  We are reviewing MRI and x-rays today.    Chronic Illnesses:    Past Medical History:   Diagnosis Date    Acromioclavicular separation     Ankle sprain     Anxiety     Arthritis     Arthritis of back     Depression     Dislocation, shoulder     Fracture of wrist     Fracture, finger     Fracture, radius     Hip arthrosis     Lumbosacral disc disease     Rotator cuff syndrome          Past  Surgical History:   Procedure Laterality Date    BACK SURGERY  2015    S1- L5    HAND SURGERY      TRIGGER POINT INJECTION      VASECTOMY         Allergies   Allergen Reactions    Sulfa Antibiotics Other (See Comments)         Current Outpatient Medications:     acetaminophen (TYLENOL) 500 MG tablet, Take 1 tablet by mouth Every 6 (Six) Hours As Needed for Mild Pain., Disp: , Rfl:     cyclobenzaprine (FLEXERIL) 10 MG tablet, Take 1 tablet by mouth 3 (Three) Times a Day As Needed for Muscle Spasms., Disp: 90 tablet, Rfl: 3    naproxen (Naprosyn) 500 MG tablet, Take 1 tablet by mouth 2 (Two) Times a Day With Meals., Disp: 60 tablet, Rfl: 5    Pataday 0.1 % ophthalmic solution, Administer 1 drop to both eyes 2 (Two) Times a Day., Disp: 5 mL, Rfl: 3    Pepcid 40 MG tablet, Take 1 tablet by mouth At Night As Needed for Heartburn., Disp: 90 tablet, Rfl: 3    sertraline (ZOLOFT) 50 MG tablet, Take 1 tablet by mouth Daily., Disp: , Rfl:     Social History     Socioeconomic History    Marital status:    Tobacco Use    Smoking status: Never     Passive exposure: Never    Smokeless tobacco: Current     Types: Snuff   Vaping Use    Vaping status: Never Used   Substance and Sexual Activity    Alcohol use: Not Currently    Drug use: Never    Sexual activity: Yes     Partners: Female     Birth control/protection: Vasectomy     Comment: 1 female partner in the last year       Family History   Problem Relation Age of Onset    Diabetes Father     No Known Problems Brother     No Known Problems Son     No Known Problems Son                Social History    Tobacco Use      Smoking status: Never        Passive exposure: Never      Smokeless tobacco: Current        Types: Snuff       Review of Systems   Constitutional:  Negative for activity change, appetite change, chills, diaphoresis, fatigue, fever and unexpected weight change.   HENT:  Negative for congestion, dental problem, drooling, ear discharge, ear pain, facial  "swelling, hearing loss, mouth sores, nosebleeds, postnasal drip, rhinorrhea, sinus pressure, sinus pain, sneezing, sore throat, tinnitus, trouble swallowing and voice change.    Eyes:  Negative for photophobia, pain, discharge, redness, itching and visual disturbance.   Respiratory:  Negative for apnea, cough, choking, chest tightness, shortness of breath, wheezing and stridor.    Cardiovascular:  Negative for chest pain, palpitations and leg swelling.   Gastrointestinal:  Negative for abdominal distention, abdominal pain, anal bleeding, blood in stool, constipation, diarrhea, nausea, rectal pain and vomiting.   Endocrine: Negative for cold intolerance, heat intolerance, polydipsia, polyphagia and polyuria.   Genitourinary:  Negative for decreased urine volume, difficulty urinating, dysuria, enuresis, flank pain, frequency, genital sores, hematuria, penile discharge, penile pain, penile swelling, scrotal swelling, testicular pain and urgency.   Musculoskeletal:  Positive for arthralgias and back pain. Negative for gait problem, joint swelling, myalgias, neck pain and neck stiffness.        RT hip/leg pain   Skin:  Negative for color change, pallor, rash and wound.   Allergic/Immunologic: Negative for environmental allergies, food allergies and immunocompromised state.   Neurological:  Negative for dizziness, tremors, seizures, syncope, facial asymmetry, speech difficulty, weakness, light-headedness, numbness and headaches.   Hematological:  Negative for adenopathy. Does not bruise/bleed easily.   Psychiatric/Behavioral:  Negative for agitation, behavioral problems, confusion, decreased concentration, dysphoric mood, hallucinations, self-injury, sleep disturbance and suicidal ideas. The patient is not nervous/anxious and is not hyperactive.    All other systems reviewed and are negative.     Physical examination:  Blood pressure 130/80, temperature 96 °F (35.6 °C), temperature source Infrared, height 182.9 cm (72\"), " weight 125 kg (275 lb 4.8 oz).  HEENT- normocephalic, atraumatic, sclera clear  Lungs-normal expansion, no wheezing  Heart-regular rate and rhythm  Extremities-positive pulses, no edema    Neurological Exam   WDWNWM  A/A/C, speech clear, attention normal, conversant, answers questions appropriately, good historian.  Cranial nerves II through XII are intact.  Motor examination does not reveal weakness in the lower extremities.   Sensation is intact.  Gait is normal, balance is normal.   No tremors are noted.  Reflexes are intact.   Cloud is negative. Clonus is negative.   Palpation reveals pain.    Radiographic Imaging:  I have reviewed this patient's diagnostic studies and there is mild annular bulging at L3-4, Mild broad-based disc bulging at L4-5 with mild facet arthropathy.  At L5-S1 there is disc space narrowing there is disc bulging on T2 images the T2 sagittal images show what appears to be a right facet cyst along with scar tissue and broad-based disc bulging.      Medical Decision Making  Diagnoses and all orders for this visit:    1. Lumbosacral disc disease (Primary)  -     Ambulatory Referral to Physical Therapy for Evaluation & Treatment    2. Primary osteoarthritis of right hip  -     Ambulatory Referral to Physical Therapy for Evaluation & Treatment    3. Chronic bilateral low back pain with left-sided sciatica    I would like to show his films to some of my colleagues however in the interim we are going to continue with conservative treatment with physical therapy and dry needling.  He is currently on Naprosyn, Flexeril and Tylenol.  He cannot take gabapentin or Lyrica.  I would like him to give me a call in the office in 2 to 3 months with an update on how he is doing.  The patient is in agreement with this plan.    Including assessment, review of prior documentation, review and interpretation of new diagnostic studies, discussing these findings with the patient and documentation, more than 30  minutes total time was spent on this appointment.     Any copied data from previous notes included in the (1) HPI, (2) PE, (3) MDM and/or assessment and plan has been reviewed and is accurate as of this day.    Dorota Andrade, PAC    Patient Care Team:  Roldan More MD as PCP - General (Family Medicine)  Luis Manuel Jackson MD as Surgeon (Orthopedic Surgery)

## 2025-04-14 ENCOUNTER — LAB (OUTPATIENT)
Dept: LAB | Facility: HOSPITAL | Age: 50
End: 2025-04-14
Payer: COMMERCIAL

## 2025-04-14 ENCOUNTER — OFFICE VISIT (OUTPATIENT)
Dept: FAMILY MEDICINE CLINIC | Facility: CLINIC | Age: 50
End: 2025-04-14
Payer: COMMERCIAL

## 2025-04-14 VITALS
OXYGEN SATURATION: 96 % | HEART RATE: 59 BPM | BODY MASS INDEX: 36.33 KG/M2 | WEIGHT: 268.2 LBS | TEMPERATURE: 98.7 F | SYSTOLIC BLOOD PRESSURE: 150 MMHG | HEIGHT: 72 IN | DIASTOLIC BLOOD PRESSURE: 92 MMHG

## 2025-04-14 DIAGNOSIS — I10 PRIMARY HYPERTENSION: Primary | ICD-10-CM

## 2025-04-14 DIAGNOSIS — R73.03 PREDIABETES: ICD-10-CM

## 2025-04-14 DIAGNOSIS — F33.41 RECURRENT MAJOR DEPRESSIVE DISORDER, IN PARTIAL REMISSION: ICD-10-CM

## 2025-04-14 DIAGNOSIS — F41.9 ANXIETY: ICD-10-CM

## 2025-04-14 DIAGNOSIS — I10 PRIMARY HYPERTENSION: ICD-10-CM

## 2025-04-14 DIAGNOSIS — M54.42 CHRONIC BILATERAL LOW BACK PAIN WITH LEFT-SIDED SCIATICA: ICD-10-CM

## 2025-04-14 DIAGNOSIS — G89.29 CHRONIC BILATERAL LOW BACK PAIN WITH LEFT-SIDED SCIATICA: ICD-10-CM

## 2025-04-14 LAB
CHOLEST SERPL-MCNC: 182 MG/DL (ref 0–200)
HBA1C MFR BLD: 7.3 % (ref 4.8–5.6)
HDLC SERPL-MCNC: 40 MG/DL (ref 40–60)
LDLC SERPL CALC-MCNC: 119 MG/DL (ref 0–100)
LDLC/HDLC SERPL: 2.9 {RATIO}
TRIGL SERPL-MCNC: 130 MG/DL (ref 0–150)
VLDLC SERPL-MCNC: 23 MG/DL (ref 5–40)

## 2025-04-14 PROCEDURE — 83036 HEMOGLOBIN GLYCOSYLATED A1C: CPT

## 2025-04-14 PROCEDURE — 99214 OFFICE O/P EST MOD 30 MIN: CPT | Performed by: FAMILY MEDICINE

## 2025-04-14 PROCEDURE — 80061 LIPID PANEL: CPT

## 2025-04-14 RX ORDER — AMLODIPINE BESYLATE 5 MG/1
5 TABLET ORAL DAILY
Qty: 90 TABLET | Refills: 1 | Status: SHIPPED | OUTPATIENT
Start: 2025-04-14

## 2025-04-14 NOTE — PROGRESS NOTES
Pulmonary Progress Note      NAME: Clare Mirza - [facility] - MRN: [id number] - Age: 79year old - : 1929    Assessment/Plan:  1. SDH - fell out of bed while sleeping  - per nsg  2. DVT - LE duplex with no DVT. H/o of two unprovoked DVT. Roly Mercedes is a 49 y.o. male who presents today for Hypertension, Back Pain, and Knee Pain      HPI     Patient's back pain has been waxing and waning but has eased up some since the injection. He will continue to follow with pain management and neurosurgery. They are considering a nerve stimulator. Left knee pain is about the same. He has been taking naproxen as needed with tylenol and this does help. He has been taking Pepcid and Tums for heart burn which does help. He has not been taking anything for HTN but has been checking blood pressure at home and sees numbers in the 150s/80s on average. He has been seeing blood pressure trend down at home. He has been trying to decrease intake of soda and has significantly reduced this. He has not been following a healthy diet but has been working on it. He has been walking with his wife for exercise.     Review of Systems   Constitutional:  Negative for fever and unexpected weight loss.   HENT:  Negative for congestion, ear pain and sore throat.    Eyes:  Negative for visual disturbance.   Respiratory:  Negative for cough, shortness of breath and wheezing.    Cardiovascular:  Negative for chest pain and palpitations.   Gastrointestinal:  Negative for abdominal pain, blood in stool, constipation, diarrhea, nausea, vomiting and GERD.   Endocrine: Negative for polydipsia and polyuria.   Genitourinary:  Negative for difficulty urinating.   Musculoskeletal:  Positive for arthralgias and back pain. Negative for joint swelling.   Skin:  Negative for rash and skin lesions.   Allergic/Immunologic: Negative for environmental allergies.   Neurological:  Negative for seizures and syncope.   Hematological:  Does not bruise/bleed easily.   Psychiatric/Behavioral:  Negative for suicidal ideas.         The following portions of the patient's history were reviewed and updated as appropriate: allergies, current medications, past family history, past medical history, past social history,  "past surgical history and problem list.    Current Outpatient Medications on File Prior to Visit   Medication Sig Dispense Refill    acetaminophen (TYLENOL) 500 MG tablet Take 1 tablet by mouth Every 6 (Six) Hours As Needed for Mild Pain.      cyclobenzaprine (FLEXERIL) 10 MG tablet Take 1 tablet by mouth 3 (Three) Times a Day As Needed for Muscle Spasms. 90 tablet 3    naproxen (Naprosyn) 500 MG tablet Take 1 tablet by mouth 2 (Two) Times a Day With Meals. 60 tablet 5    Pataday 0.1 % ophthalmic solution Administer 1 drop to both eyes 2 (Two) Times a Day. 5 mL 3    Pepcid 40 MG tablet Take 1 tablet by mouth At Night As Needed for Heartburn. 90 tablet 3    [DISCONTINUED] sertraline (ZOLOFT) 50 MG tablet Take 1 tablet by mouth Daily.       No current facility-administered medications on file prior to visit.       Allergies   Allergen Reactions    Sulfa Antibiotics Other (See Comments)        Visit Vitals  /92 (BP Location: Left arm, Patient Position: Sitting, Cuff Size: Adult)   Pulse 59   Temp 98.7 °F (37.1 °C) (Infrared)   Ht 182.9 cm (72\")   Wt 122 kg (268 lb 3.2 oz)   SpO2 96%   BMI 36.37 kg/m²        Physical Exam  Constitutional:       General: He is not in acute distress.     Appearance: He is well-developed. He is not diaphoretic.   HENT:      Head: Atraumatic.   Cardiovascular:      Rate and Rhythm: Normal rate and regular rhythm.      Heart sounds: Normal heart sounds. No murmur heard.     No friction rub. No gallop.   Pulmonary:      Effort: Pulmonary effort is normal. No respiratory distress.      Breath sounds: Normal breath sounds. No stridor. No wheezing, rhonchi or rales.   Musculoskeletal:      Cervical back: Normal range of motion and neck supple.   Skin:     General: Skin is warm and dry.   Neurological:      Mental Status: He is alert and oriented to person, place, and time.   Psychiatric:         Behavior: Behavior normal.          Results for orders placed or performed in visit on 10/23/24 " deformity. Heart: Regular rate and rhythm, normal S1S2, no murmur. Abdomen: soft, non-tender, non-distended, positive BS.    Extremity: +edema    Recent Labs   Lab  05/25/18   0702   RBC  3.69*   HGB  11.0*   HCT  33.1*   MCV  89.7   MCH  29.8   MCHC  3   Comprehensive Metabolic Panel    Collection Time: 10/23/24 11:17 AM    Specimen: Blood   Result Value Ref Range    Glucose 160 (H) 65 - 99 mg/dL    BUN 9 6 - 20 mg/dL    Creatinine 0.95 0.76 - 1.27 mg/dL    Sodium 138 136 - 145 mmol/L    Potassium 4.1 3.5 - 5.2 mmol/L    Chloride 102 98 - 107 mmol/L    CO2 26.4 22.0 - 29.0 mmol/L    Calcium 9.2 8.6 - 10.5 mg/dL    Total Protein 7.5 6.0 - 8.5 g/dL    Albumin 4.2 3.5 - 5.2 g/dL    ALT (SGPT) 26 1 - 41 U/L    AST (SGOT) 25 1 - 40 U/L    Alkaline Phosphatase 108 39 - 117 U/L    Total Bilirubin 0.3 0.0 - 1.2 mg/dL    Globulin 3.3 gm/dL    A/G Ratio 1.3 g/dL    BUN/Creatinine Ratio 9.5 7.0 - 25.0    Anion Gap 9.6 5.0 - 15.0 mmol/L    eGFR 97.5 >60.0 mL/min/1.73   TSH Rfx On Abnormal To Free T4    Collection Time: 10/23/24 11:17 AM    Specimen: Blood   Result Value Ref Range    TSH 3.390 0.270 - 4.200 uIU/mL   Lipid Panel    Collection Time: 10/23/24 11:17 AM    Specimen: Blood   Result Value Ref Range    Total Cholesterol 192 0 - 200 mg/dL    Triglycerides 133 0 - 150 mg/dL    HDL Cholesterol 40 40 - 60 mg/dL    LDL Cholesterol  128 (H) 0 - 100 mg/dL    VLDL Cholesterol 24 5 - 40 mg/dL    LDL/HDL Ratio 3.14    PSA Screen    Collection Time: 10/23/24 11:17 AM    Specimen: Blood   Result Value Ref Range    PSA 1.690 0.000 - 4.000 ng/mL   Hemoglobin A1c    Collection Time: 10/23/24 11:17 AM    Specimen: Blood   Result Value Ref Range    Hemoglobin A1C 6.10 (H) 4.80 - 5.60 %   Hepatitis C Antibody    Collection Time: 10/23/24 11:17 AM    Specimen: Blood   Result Value Ref Range    Hepatitis C Ab Non-Reactive Non-Reactive   CBC Auto Differential    Collection Time: 10/23/24 11:17 AM    Specimen: Blood   Result Value Ref Range    WBC 6.65 3.40 - 10.80 10*3/mm3    RBC 5.65 4.14 - 5.80 10*6/mm3    Hemoglobin 15.3 13.0 - 17.7 g/dL    Hematocrit 46.9 37.5 - 51.0 %    MCV 83.0 79.0 - 97.0 fL    MCH 27.1 26.6 - 33.0 pg    MCHC 32.6 31.5 - 35.7 g/dL    RDW 12.7 12.3 - 15.4 %     RDW-SD 37.8 37.0 - 54.0 fl    MPV 10.6 6.0 - 12.0 fL    Platelets 194 140 - 450 10*3/mm3    Neutrophil % 60.8 42.7 - 76.0 %    Lymphocyte % 28.0 19.6 - 45.3 %    Monocyte % 8.0 5.0 - 12.0 %    Eosinophil % 2.4 0.3 - 6.2 %    Basophil % 0.5 0.0 - 1.5 %    Immature Grans % 0.3 0.0 - 0.5 %    Neutrophils, Absolute 4.05 1.70 - 7.00 10*3/mm3    Lymphocytes, Absolute 1.86 0.70 - 3.10 10*3/mm3    Monocytes, Absolute 0.53 0.10 - 0.90 10*3/mm3    Eosinophils, Absolute 0.16 0.00 - 0.40 10*3/mm3    Basophils, Absolute 0.03 0.00 - 0.20 10*3/mm3    Immature Grans, Absolute 0.02 0.00 - 0.05 10*3/mm3    nRBC 0.0 0.0 - 0.2 /100 WBC        Problems Addressed this Visit          Cardiac and Vasculature    Hypertension - Primary    Hypertension is uncontrolled  Medication changes per orders.  Dietary sodium restriction.  Weight loss.  Regular aerobic exercise.  Ambulatory blood pressure monitoring.  Blood pressure will be reassessedin 3 months.         Relevant Medications    amLODIPine (NORVASC) 5 MG tablet    Other Relevant Orders    Comprehensive Metabolic Panel    Lipid Panel    Hemoglobin A1c       Endocrine and Metabolic    Prediabetes    Relevant Orders    Comprehensive Metabolic Panel    Lipid Panel    Hemoglobin A1c       Mental Health    Anxiety    Anxiety and depression worsening as he has taken Zoloft inconsistently.  Patient was given a refill today and advised to take daily.  Patient was also offered as needed medication for anxiety but he politely Clines.  Patient follow-up in 3 months.         Relevant Medications    sertraline (ZOLOFT) 50 MG tablet    Depression    Relevant Medications    sertraline (ZOLOFT) 50 MG tablet       Musculoskeletal and Injuries    Chronic bilateral low back pain with left-sided sciatica    Chronic low back and knee pain.  Patient taking naproxen and Tylenol sparingly.  Patient following with pain clinic.  Patient was advised to use naproxen twice daily with meals and turmeric 500 mg twice  daily with meals for better pain control.  RTC/ED precautions given.          Diagnoses         Codes Comments      Primary hypertension    -  Primary ICD-10-CM: I10  ICD-9-CM: 401.9       Prediabetes     ICD-10-CM: R73.03  ICD-9-CM: 790.29       Anxiety     ICD-10-CM: F41.9  ICD-9-CM: 300.00       Recurrent major depressive disorder, in partial remission     ICD-10-CM: F33.41  ICD-9-CM: 296.35       Chronic bilateral low back pain with left-sided sciatica     ICD-10-CM: G89.29, M54.42  ICD-9-CM: 338.29, 724.2, 724.3             Return in about 3 months (around 7/14/2025) for Follow-up back pain, HTN, GERD, anxiety, depression.    Roldan More MD   4/14/2025

## 2025-04-14 NOTE — ASSESSMENT & PLAN NOTE
Anxiety and depression worsening as he has taken Zoloft inconsistently.  Patient was given a refill today and advised to take daily.  Patient was also offered as needed medication for anxiety but he politely Clines.  Patient follow-up in 3 months.

## 2025-04-14 NOTE — LETTER
April 14, 2025     Patient: Roly Mercedes   YOB: 1975   Date of Visit: 4/14/2025       To Whom It May Concern:    Roly Mercedes was seen in my clinic on 4/14/2025. He should remain out of work for continuation of care for ongoing medical services for back, knee, and hip pain until follow-up appointment on 7/14/2025 at which point he will be re-examined.          Sincerely,        Roldan More MD    CC: No Recipients

## 2025-04-14 NOTE — ASSESSMENT & PLAN NOTE
Chronic low back and knee pain.  Patient taking naproxen and Tylenol sparingly.  Patient following with pain clinic.  Patient was advised to use naproxen twice daily with meals and turmeric 500 mg twice daily with meals for better pain control.  RTC/ED precautions given.

## 2025-06-26 ENCOUNTER — OFFICE VISIT (OUTPATIENT)
Age: 50
End: 2025-06-26
Payer: COMMERCIAL

## 2025-06-26 DIAGNOSIS — M25.552 BILATERAL HIP PAIN: Primary | ICD-10-CM

## 2025-06-26 DIAGNOSIS — M16.0 BILATERAL PRIMARY OSTEOARTHRITIS OF HIP: ICD-10-CM

## 2025-06-26 DIAGNOSIS — M25.551 BILATERAL HIP PAIN: Primary | ICD-10-CM

## 2025-06-26 RX ORDER — BUPIVACAINE HYDROCHLORIDE 5 MG/ML
2 INJECTION, SOLUTION EPIDURAL; INTRACAUDAL; PERINEURAL
Status: COMPLETED | OUTPATIENT
Start: 2025-06-26 | End: 2025-06-26

## 2025-06-26 RX ORDER — LIDOCAINE HYDROCHLORIDE 10 MG/ML
2 INJECTION, SOLUTION EPIDURAL; INFILTRATION; INTRACAUDAL; PERINEURAL
Status: COMPLETED | OUTPATIENT
Start: 2025-06-26 | End: 2025-06-26

## 2025-06-26 RX ORDER — TRIAMCINOLONE ACETONIDE 40 MG/ML
80 INJECTION, SUSPENSION INTRA-ARTICULAR; INTRAMUSCULAR
Status: COMPLETED | OUTPATIENT
Start: 2025-06-26 | End: 2025-06-26

## 2025-06-26 RX ADMIN — BUPIVACAINE HYDROCHLORIDE 2 ML: 5 INJECTION, SOLUTION EPIDURAL; INTRACAUDAL; PERINEURAL at 09:31

## 2025-06-26 RX ADMIN — TRIAMCINOLONE ACETONIDE 80 MG: 40 INJECTION, SUSPENSION INTRA-ARTICULAR; INTRAMUSCULAR at 09:31

## 2025-06-26 RX ADMIN — LIDOCAINE HYDROCHLORIDE 2 ML: 10 INJECTION, SOLUTION EPIDURAL; INFILTRATION; INTRACAUDAL; PERINEURAL at 09:31

## 2025-06-26 NOTE — PROGRESS NOTES
Procedure   - Large Joint Arthrocentesis: bilateral hip joint on 6/26/2025 9:31 AM  Indications: pain  Details: 21 G (22 spinal   ) needle, ultrasound-guided anterior approach  Medications (Right): 2 mL lidocaine PF 1% 1 %; 80 mg triamcinolone acetonide 40 MG/ML; 2 mL bupivacaine (PF) 0.5 %  Medications (Left): 2 mL lidocaine PF 1% 1 %; 80 mg triamcinolone acetonide 40 MG/ML; 2 mL bupivacaine (PF) 0.5 %  Outcome: tolerated well, no immediate complications  Procedure, treatment alternatives, risks and benefits explained, specific risks discussed. Consent was given by the patient. Immediately prior to procedure a time out was called to verify the correct patient, procedure, equipment, support staff and site/side marked as required. Patient was prepped and draped in the usual sterile fashion.        49-year-old male presents with bilateral hip pain from bilateral hip osteoarthritis.  Patient is  here for ultrasound-guided bilateral hip joint corticosteroid injection.  Previous office documentation and images were personally reviewed prior to the visit.  We discussed them in detail how they correlate to experienced symptoms.  I explained the procedure in detail.  I answered all questions to the best my ability.  Risks and benefits as well as post procedure instructions were provided.  Patient elected to proceed and tolerated this procedure well.  See procedure note.  Follow-up with me will be on an as-needed basis.

## 2025-07-01 ENCOUNTER — OFFICE VISIT (OUTPATIENT)
Dept: FAMILY MEDICINE CLINIC | Facility: CLINIC | Age: 50
End: 2025-07-01
Payer: COMMERCIAL

## 2025-07-01 VITALS
RESPIRATION RATE: 18 BRPM | WEIGHT: 259 LBS | BODY MASS INDEX: 35.08 KG/M2 | HEIGHT: 72 IN | DIASTOLIC BLOOD PRESSURE: 74 MMHG | TEMPERATURE: 97.8 F | HEART RATE: 53 BPM | OXYGEN SATURATION: 94 % | SYSTOLIC BLOOD PRESSURE: 152 MMHG

## 2025-07-01 DIAGNOSIS — R05.1 ACUTE COUGH: Primary | ICD-10-CM

## 2025-07-01 PROCEDURE — 99213 OFFICE O/P EST LOW 20 MIN: CPT | Performed by: STUDENT IN AN ORGANIZED HEALTH CARE EDUCATION/TRAINING PROGRAM

## 2025-07-01 RX ORDER — DEXTROMETHORPHAN HYDROBROMIDE AND PROMETHAZINE HYDROCHLORIDE 15; 6.25 MG/5ML; MG/5ML
5 SYRUP ORAL 4 TIMES DAILY PRN
Qty: 118 ML | Refills: 0 | Status: SHIPPED | OUTPATIENT
Start: 2025-07-01

## 2025-07-01 RX ORDER — BENZONATATE 100 MG/1
100 CAPSULE ORAL 3 TIMES DAILY PRN
Qty: 30 CAPSULE | Refills: 1 | Status: SHIPPED | OUTPATIENT
Start: 2025-07-01

## 2025-07-01 RX ORDER — DOXYCYCLINE 100 MG/1
100 TABLET ORAL 2 TIMES DAILY
Qty: 14 TABLET | Refills: 0 | Status: SHIPPED | OUTPATIENT
Start: 2025-07-01 | End: 2025-07-08

## 2025-07-01 NOTE — PROGRESS NOTES
Established Patient Office Visit        Subjective      Chief Complaint:  URI (Cough, sinus pressure, congestion x 4 days)      History of Present Illness: Roly Mercedes is a 49 y.o. male who presents for cough.    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 Bothersome cough. Some congestions. Now spouse is coughing. No known fevers. + fatigue.       Patient Active Problem List   Diagnosis    Osteoarthritis of right hip    Gastroesophageal reflux disease without esophagitis    Asthma    Anxiety    Depression    Chronic bilateral low back pain with left-sided sciatica    Environmental and seasonal allergies    Long term (current) use of non-steroidal anti-inflammatories (nsaid)    Well adult exam    Class 2 obesity due to excess calories without serious comorbidity with body mass index (BMI) of 36.0 to 36.9 in adult    Prostate cancer screening    Hypertension    Gastroesophageal reflux disease    Acute pain of left knee    Lumbar radiculopathy    Lumbosacral disc disease    Prediabetes         Current Outpatient Medications:     acetaminophen (TYLENOL) 500 MG tablet, Take 1 tablet by mouth Every 6 (Six) Hours As Needed for Mild Pain., Disp: , Rfl:     naproxen (Naprosyn) 500 MG tablet, Take 1 tablet by mouth 2 (Two) Times a Day With Meals., Disp: 60 tablet, Rfl: 5    Pataday 0.1 % ophthalmic solution, Administer 1 drop to both eyes 2 (Two) Times a Day., Disp: 5 mL, Rfl: 3    Pepcid 40 MG tablet, Take 1 tablet by mouth At Night As Needed for Heartburn., Disp: 90 tablet, Rfl: 3    sertraline (ZOLOFT) 50 MG tablet, Take 1 tablet by mouth Daily., Disp: 90 tablet, Rfl: 3    amLODIPine (NORVASC) 5 MG tablet, Take 1 tablet by mouth Daily., Disp: 90 tablet, Rfl: 1    benzonatate (Tessalon Perles) 100 MG capsule, Take 1 capsule by mouth 3 (Three) Times a Day As Needed for Cough., Disp: 30 capsule, Rfl: 1    doxycycline (ADOXA) 100 MG tablet, Take 1 tablet by mouth 2 (Two) Times a Day for 7 days., Disp: 14 tablet, Rfl:  "0    promethazine-dextromethorphan (PROMETHAZINE-DM) 6.25-15 MG/5ML syrup, Take 5 mL by mouth 4 (Four) Times a Day As Needed for Cough., Disp: 118 mL, Rfl: 0       Objective     Physical Exam:   Vital Signs:   /74   Pulse 53   Temp 97.8 °F (36.6 °C) (Temporal)   Resp 18   Ht 182.9 cm (72.01\")   Wt 117 kg (259 lb)   SpO2 94%   BMI 35.12 kg/m²      Physical Exam  Constitutional:       General: He is not in acute distress.     Appearance: He is not ill-appearing.   Cardiovascular:      Rate and Rhythm: Normal rate and regular rhythm.   Pulmonary:      Effort: Pulmonary effort is normal.      Breath sounds: Normal breath sounds.  Mild rhonchi right lung field  Neurological:      Mental Status: He is alert.   Psychiatric:         Thought Content: Thought content normal.            Assessment / Plan      Assessment/Plan:   Diagnoses and all orders for this visit:    1. Acute cough (Primary)  -     promethazine-dextromethorphan (PROMETHAZINE-DM) 6.25-15 MG/5ML syrup; Take 5 mL by mouth 4 (Four) Times a Day As Needed for Cough.  Dispense: 118 mL; Refill: 0  -     benzonatate (Tessalon Perles) 100 MG capsule; Take 1 capsule by mouth 3 (Three) Times a Day As Needed for Cough.  Dispense: 30 capsule; Refill: 1  -     doxycycline (ADOXA) 100 MG tablet; Take 1 tablet by mouth 2 (Two) Times a Day for 7 days.  Dispense: 14 tablet; Refill: 0       Question early bronchitis if not improving or worsening with start doxycycline.  He declines COVID testing today which is reasonable    Follow Up:   No follow-ups on file.    MDM:     Sam Nur MD  Family Medicine - [facility] [place]  "

## 2025-07-16 ENCOUNTER — OFFICE VISIT (OUTPATIENT)
Dept: FAMILY MEDICINE CLINIC | Facility: CLINIC | Age: 50
End: 2025-07-16
Payer: COMMERCIAL

## 2025-07-16 ENCOUNTER — LAB (OUTPATIENT)
Dept: LAB | Facility: HOSPITAL | Age: 50
End: 2025-07-16
Payer: COMMERCIAL

## 2025-07-16 VITALS
SYSTOLIC BLOOD PRESSURE: 130 MMHG | DIASTOLIC BLOOD PRESSURE: 76 MMHG | HEIGHT: 72 IN | BODY MASS INDEX: 35.16 KG/M2 | HEART RATE: 58 BPM | TEMPERATURE: 98 F | WEIGHT: 259.6 LBS | OXYGEN SATURATION: 98 %

## 2025-07-16 DIAGNOSIS — M25.512 ACUTE PAIN OF LEFT SHOULDER: ICD-10-CM

## 2025-07-16 DIAGNOSIS — I10 PRIMARY HYPERTENSION: ICD-10-CM

## 2025-07-16 DIAGNOSIS — M16.11 PRIMARY OSTEOARTHRITIS OF RIGHT HIP: ICD-10-CM

## 2025-07-16 DIAGNOSIS — G89.29 CHRONIC BILATERAL LOW BACK PAIN WITH LEFT-SIDED SCIATICA: ICD-10-CM

## 2025-07-16 DIAGNOSIS — F41.9 ANXIETY: ICD-10-CM

## 2025-07-16 DIAGNOSIS — R73.03 PREDIABETES: ICD-10-CM

## 2025-07-16 DIAGNOSIS — F33.41 RECURRENT MAJOR DEPRESSIVE DISORDER, IN PARTIAL REMISSION: ICD-10-CM

## 2025-07-16 DIAGNOSIS — M54.42 CHRONIC BILATERAL LOW BACK PAIN WITH LEFT-SIDED SCIATICA: ICD-10-CM

## 2025-07-16 DIAGNOSIS — I10 PRIMARY HYPERTENSION: Primary | ICD-10-CM

## 2025-07-16 LAB
ALBUMIN SERPL-MCNC: 4.3 G/DL (ref 3.5–5.2)
ALBUMIN/GLOB SERPL: 1.4 G/DL
ALP SERPL-CCNC: 106 U/L (ref 39–117)
ALT SERPL W P-5'-P-CCNC: 32 U/L (ref 1–41)
ANION GAP SERPL CALCULATED.3IONS-SCNC: 13 MMOL/L (ref 5–15)
AST SERPL-CCNC: 29 U/L (ref 1–40)
BILIRUB SERPL-MCNC: 0.3 MG/DL (ref 0–1.2)
BUN SERPL-MCNC: 12 MG/DL (ref 6–20)
BUN/CREAT SERPL: 12.9 (ref 7–25)
CALCIUM SPEC-SCNC: 9.3 MG/DL (ref 8.6–10.5)
CHLORIDE SERPL-SCNC: 99 MMOL/L (ref 98–107)
CO2 SERPL-SCNC: 25 MMOL/L (ref 22–29)
CREAT SERPL-MCNC: 0.93 MG/DL (ref 0.76–1.27)
EGFRCR SERPLBLD CKD-EPI 2021: 100 ML/MIN/1.73
GLOBULIN UR ELPH-MCNC: 3.1 GM/DL
GLUCOSE SERPL-MCNC: 179 MG/DL (ref 65–99)
HBA1C MFR BLD: 7.1 % (ref 4.8–5.6)
POTASSIUM SERPL-SCNC: 4 MMOL/L (ref 3.5–5.2)
PROT SERPL-MCNC: 7.4 G/DL (ref 6–8.5)
SODIUM SERPL-SCNC: 137 MMOL/L (ref 136–145)

## 2025-07-16 PROCEDURE — 83036 HEMOGLOBIN GLYCOSYLATED A1C: CPT

## 2025-07-16 PROCEDURE — 99214 OFFICE O/P EST MOD 30 MIN: CPT | Performed by: FAMILY MEDICINE

## 2025-07-16 PROCEDURE — 80053 COMPREHEN METABOLIC PANEL: CPT

## 2025-07-16 RX ORDER — NAPROXEN 500 MG/1
500 TABLET ORAL 2 TIMES DAILY WITH MEALS
Qty: 60 TABLET | Refills: 5 | Status: SHIPPED | OUTPATIENT
Start: 2025-07-16

## 2025-07-16 NOTE — PROGRESS NOTES
Roly Mercedes is a 49 y.o. male who presents today for Hypertension, Anxiety, Depression, and Shoulder Pain      HPI     Patient has been checking his blood pressure at home and has been seeing it trend down but remains a little higher than here since starting amlodipine. He does feel like this makes him feel a little tired. He has been taking the sertraline as directed but does not always remember to take this. He feels like it has been helping and does not want to increase dose. Patient has been working on improving diet and has been drinking less sugary drinks. He has started walking for exercise. Patient has had left lateral shoulder pain for the last week. He states he woke up with the pain. It is a sharp pain when he moves the arm. He states its getting worse. He does not have pain at rest but any overhead movement makes the pain worse. He has not been taking anything for the pain. He has tried some topical OTC medication but did not get relief. He has not had injury or change in activity level. Pain does not radiate. He denies tingling, numbness, and weakness. He does say when he sleeps on that he will have some tingling and numbness in his hand.         7/16/2025     9:00 AM   PHQ-2/PHQ-9 Depression Screening   Little interest or pleasure in doing things Several days   Feeling down, depressed, or hopeless Not at all   Trouble falling or staying asleep, or sleeping too much Almost all   Feeling tired or having little energy Almost all   Poor appetite or overeating Not at all   Feeling bad about yourself - or that you are a failure or have let yourself or your family down Several days   Trouble concentrating on things, such as reading the newspaper or watching television Not at all   Moving or speaking so slowly that other people could have noticed? Or the opposite - being so fidgety or restless that you have been moving around a lot more than usual. Not at all   Thoughts that you would be better off dead or  hurting yourself in some way Not at all   Patient Health Questionnaire-9 Score 8   How difficult have these problems made it for you to do your work, take care of things at home, or get along with other people? Somewhat difficult     ANDRESSA-7  Over the last two weeks, how often have you been bothered by the following problems?  Feeling nervous, anxious or on edge: 2  Not being able to stop or control worryin  Worrying too much about different things: 0  Trouble Relaxin  Being so restless that it is hard to sit still: 2  Becoming easily annoyed or irritable: 3  Feeling afraid as if something awful might happen: 1  ANDRESSA 7 Total Score: 10  If you checked any problems, how difficult have these problems made it for you to do your work, take care of things at home, or get along with other people: Somewhat difficult        Review of Systems   Constitutional:  Negative for fever and unexpected weight loss.   HENT:  Negative for congestion, ear pain and sore throat.    Eyes:  Negative for visual disturbance.   Respiratory:  Negative for cough, shortness of breath and wheezing.    Cardiovascular:  Negative for chest pain and palpitations.   Gastrointestinal:  Negative for abdominal pain, blood in stool, constipation, diarrhea, nausea, vomiting and GERD.   Endocrine: Negative for polydipsia and polyuria.   Genitourinary:  Negative for difficulty urinating.   Musculoskeletal:  Positive for arthralgias and back pain. Negative for joint swelling.   Skin:  Negative for rash and skin lesions.   Allergic/Immunologic: Negative for environmental allergies.   Neurological:  Negative for seizures and syncope.   Hematological:  Does not bruise/bleed easily.   Psychiatric/Behavioral:  Negative for suicidal ideas.         The following portions of the patient's history were reviewed and updated as appropriate: allergies, current medications, past family history, past medical history, past social history, past surgical history and  "problem list.    Current Outpatient Medications on File Prior to Visit   Medication Sig Dispense Refill    acetaminophen (TYLENOL) 500 MG tablet Take 1 tablet by mouth Every 6 (Six) Hours As Needed for Mild Pain.      amLODIPine (NORVASC) 5 MG tablet Take 1 tablet by mouth Daily. 90 tablet 1    Pepcid 40 MG tablet Take 1 tablet by mouth At Night As Needed for Heartburn. 90 tablet 3    sertraline (ZOLOFT) 50 MG tablet Take 1 tablet by mouth Daily. 90 tablet 3    [DISCONTINUED] naproxen (Naprosyn) 500 MG tablet Take 1 tablet by mouth 2 (Two) Times a Day With Meals. 60 tablet 5    Pataday 0.1 % ophthalmic solution Administer 1 drop to both eyes 2 (Two) Times a Day. (Patient not taking: Reported on 7/16/2025) 5 mL 3    [DISCONTINUED] benzonatate (Tessalon Perles) 100 MG capsule Take 1 capsule by mouth 3 (Three) Times a Day As Needed for Cough. (Patient not taking: Reported on 7/16/2025) 30 capsule 1    [DISCONTINUED] promethazine-dextromethorphan (PROMETHAZINE-DM) 6.25-15 MG/5ML syrup Take 5 mL by mouth 4 (Four) Times a Day As Needed for Cough. (Patient not taking: Reported on 7/16/2025) 118 mL 0     No current facility-administered medications on file prior to visit.       Allergies   Allergen Reactions    Sulfa Antibiotics Other (See Comments)        Visit Vitals  /76 (BP Location: Left arm, Patient Position: Sitting, Cuff Size: Adult)   Pulse 58   Temp 98 °F (36.7 °C) (Infrared)   Ht 182.9 cm (72\")   Wt 118 kg (259 lb 9.6 oz)   SpO2 98%   BMI 35.21 kg/m²        Physical Exam  Constitutional:       General: He is not in acute distress.     Appearance: He is well-developed. He is not diaphoretic.   HENT:      Head: Atraumatic.   Cardiovascular:      Rate and Rhythm: Normal rate and regular rhythm.      Heart sounds: Normal heart sounds. No murmur heard.     No friction rub. No gallop.   Pulmonary:      Effort: Pulmonary effort is normal. No respiratory distress.      Breath sounds: Normal breath sounds. No " stridor. No wheezing, rhonchi or rales.   Musculoskeletal:      Left shoulder: Tenderness (AC joint) present. No swelling, deformity, effusion, laceration, bony tenderness or crepitus. Normal range of motion. Normal strength.      Cervical back: Normal range of motion and neck supple.   Skin:     General: Skin is warm and dry.   Neurological:      Mental Status: He is alert and oriented to person, place, and time.   Psychiatric:         Behavior: Behavior normal.          Results for orders placed or performed in visit on 04/14/25   Lipid Panel    Collection Time: 04/14/25 10:30 AM    Specimen: Blood   Result Value Ref Range    Total Cholesterol 182 0 - 200 mg/dL    Triglycerides 130 0 - 150 mg/dL    HDL Cholesterol 40 40 - 60 mg/dL    LDL Cholesterol  119 (H) 0 - 100 mg/dL    VLDL Cholesterol 23 5 - 40 mg/dL    LDL/HDL Ratio 2.90    Hemoglobin A1c    Collection Time: 04/14/25 10:30 AM    Specimen: Blood   Result Value Ref Range    Hemoglobin A1C 7.30 (H) 4.80 - 5.60 %        Problems Addressed this Visit          Cardiac and Vasculature    Hypertension - Primary    Hypertension is stable and controlled  Continue current treatment regimen.  Blood pressure will be reassessed in 3 months.         Relevant Orders    Comprehensive Metabolic Panel    Hemoglobin A1c       Endocrine and Metabolic    Prediabetes    Worsening on last labs with A1c above 7.  Will recheck today.  Patient is not interested in starting medication at this time and will work on diet and exercise.         Relevant Orders    Comprehensive Metabolic Panel    Hemoglobin A1c       Mental Health    Anxiety    Depression    Depression anxiety are chronic and improving with Zoloft 50 mg daily.  He will continue this for the time being.  Follow-up in 3 months.            Musculoskeletal and Injuries    Osteoarthritis of right hip    Relevant Medications    naproxen (Naprosyn) 500 MG tablet    Chronic bilateral low back pain with left-sided sciatica     Relevant Medications    naproxen (Naprosyn) 500 MG tablet    Acute pain of left shoulder    Patient feels the injections have helped with his pain overall but he began having left shoulder pain that he woke up with about a week ago.  Patient has pain with overhead range of motion.  Range of motion and strength are intact.  Patient is advised to avoid sleeping on that shoulder as it seemed to worsen the symptoms.  He will also restart the naproxen 500 mg twice daily with meals.  Will order x-ray for further evaluation.  Patient was offered physical therapy but politely declines.  RTC/ED precautions given.         Relevant Medications    naproxen (Naprosyn) 500 MG tablet    Other Relevant Orders    XR Shoulder 2+ View Left     Diagnoses         Codes Comments      Primary hypertension    -  Primary ICD-10-CM: I10  ICD-9-CM: 401.9       Anxiety     ICD-10-CM: F41.9  ICD-9-CM: 300.00       Recurrent major depressive disorder, in partial remission     ICD-10-CM: F33.41  ICD-9-CM: 296.35       Prediabetes     ICD-10-CM: R73.03  ICD-9-CM: 790.29       Chronic bilateral low back pain with left-sided sciatica     ICD-10-CM: G89.29, M54.42  ICD-9-CM: 338.29, 724.2, 724.3       Primary osteoarthritis of right hip     ICD-10-CM: M16.11  ICD-9-CM: 715.15       Acute pain of left shoulder     ICD-10-CM: M25.512  ICD-9-CM: 719.41             Return in about 3 months (around 10/24/2025) for Annual.    Roldan More MD   7/16/2025

## 2025-07-16 NOTE — PATIENT INSTRUCTIONS
Diabetes Mellitus and Nutrition, Adult  When you have diabetes, or diabetes mellitus, it is very important to have healthy eating habits because your blood sugar (glucose) levels are greatly affected by what you eat and drink. Eating healthy foods in the right amounts, at about the same times every day, can help you:  Manage your blood glucose.  Lower your risk of heart disease.  Improve your blood pressure.  Reach or maintain a healthy weight.  What can affect my meal plan?  Every person with diabetes is different, and each person has different needs for a meal plan. Your health care provider may recommend that you work with a dietitian to make a meal plan that is best for you. Your meal plan may vary depending on factors such as:  The calories you need.  The medicines you take.  Your weight.  Your blood glucose, blood pressure, and cholesterol levels.  Your activity level.  Other health conditions you have, such as heart or kidney disease.  How do carbohydrates affect me?  Carbohydrates, also called carbs, affect your blood glucose level more than any other type of food. Eating carbs raises the amount of glucose in your blood.  It is important to know how many carbs you can safely have in each meal. This is different for every person. Your dietitian can help you calculate how many carbs you should have at each meal and for each snack.  How does alcohol affect me?  Alcohol can cause a decrease in blood glucose (hypoglycemia), especially if you use insulin or take certain diabetes medicines by mouth. Hypoglycemia can be a life-threatening condition. Symptoms of hypoglycemia, such as sleepiness, dizziness, and confusion, are similar to symptoms of having too much alcohol.  Do not drink alcohol if:  Your health care provider tells you not to drink.  You are pregnant, may be pregnant, or are planning to become pregnant.  If you drink alcohol:  Limit how much you have to:  0-1 drink a day for women.  0-2 drinks a day  "for men.  Know how much alcohol is in your drink. In the [place] one drink equals one 12 oz bottle of beer (355 mL), one 5 oz glass of wine (148 mL), or one 1½ oz glass of hard liquor (44 mL).  Keep yourself hydrated with water, diet soda, or unsweetened iced tea. Keep in mind that regular soda, juice, and other mixers may contain a lot of sugar and must be counted as carbs.  What are tips for following this plan?    Reading food labels  Start by checking the serving size on the Nutrition Facts label of packaged foods and drinks. The number of calories and the amount of carbs, fats, and other nutrients listed on the label are based on one serving of the item. Many items contain more than one serving per package.  Check the total grams (g) of carbs in one serving.  Check the number of grams of saturated fats and trans fats in one serving. Choose foods that have a low amount or none of these fats.  Check the number of milligrams (mg) of salt (sodium) in one serving. Most people should limit total sodium intake to less than 2,300 mg per day.  Always check the nutrition information of foods labeled as \"low-fat\" or \"nonfat.\" These foods may be higher in added sugar or refined carbs and should be avoided.  Talk to your dietitian to identify your daily goals for nutrients listed on the label.  Shopping  Avoid buying canned, pre-made, or processed foods. These foods tend to be high in fat, sodium, and added sugar.  Shop around the outside edge of the grocery store. This is where you will most often find fresh fruits and vegetables, bulk grains, fresh meats, and fresh dairy products.  Cooking  Use low-heat cooking methods, such as baking, instead of high-heat cooking methods, such as deep frying.  Cook using healthy oils, such as olive, canola, or sunflower oil.  Avoid cooking with butter, cream, or high-fat meats.  Meal planning  Eat meals and snacks regularly, preferably at the same times every day. Avoid going long periods " of time without eating.  Eat foods that are high in fiber, such as fresh fruits, vegetables, beans, and whole grains.  Eat 4-6 oz (112-168 g) of lean protein each day, such as lean meat, chicken, fish, eggs, or tofu. One ounce (oz) (28 g) of lean protein is equal to:  1 oz (28 g) of meat, chicken, or fish.  1 egg.  ¼ cup (62 g) of tofu.  Eat some foods each day that contain healthy fats, such as avocado, nuts, seeds, and fish.  What foods should I eat?  Fruits  Berries. Apples. Oranges. Peaches. Apricots. Plums. Grapes. Mangoes. Papayas. Pomegranates. Kiwi. Cherries.  Vegetables  Leafy greens, including lettuce, spinach, kale, chard, el[place] greens, mustard greens, and cabbage. Beets. Cauliflower. Broccoli. Carrots. Green beans. Tomatoes. Peppers. Onions. Cucumbers. [place] sprouts.  Grains  Whole grains, such as whole-wheat or whole-grain bread, crackers, tortillas, cereal, and pasta. Unsweetened oatmeal. Quinoa. Brown or wild rice.  Meats and other proteins  Seafood. Poultry without skin. Lean cuts of poultry and beef. Tofu. Nuts. Seeds.  Dairy  Low-fat or fat-free dairy products such as milk, yogurt, and cheese.  The items listed above may not be a complete list of foods and beverages you can eat and drink. Contact a dietitian for more information.  What foods should I avoid?  Fruits  Fruits canned with syrup.  Vegetables  Canned vegetables. Frozen vegetables with butter or cream sauce.  Grains  Refined white flour and flour products such as bread, pasta, snack foods, and cereals. Avoid all processed foods.  Meats and other proteins  Fatty cuts of meat. Poultry with skin. Breaded or fried meats. Processed meat. Avoid saturated fats.  Dairy  Full-fat yogurt, cheese, or milk.  Beverages  Sweetened drinks, such as soda or iced tea.  The items listed above may not be a complete list of foods and beverages you should avoid. Contact a dietitian for more information.  Questions to ask a health care provider  Do I need  "to meet with a certified diabetes care and ?  Do I need to meet with a dietitian?  What number can I call if I have questions?  When are the best times to check my blood glucose?  Where to find more information:  American Diabetes Association: diabetes.org  Academy of Nutrition and Dietetics: eatright.org  National [place] of Diabetes and Digestive and Kidney Diseases: niddk.nih.gov  Association of Diabetes Care & Education Specialists: diabeteseducator.org  Summary  It is important to have healthy eating habits because your blood sugar (glucose) levels are greatly affected by what you eat and drink. It is important to use alcohol carefully.  A healthy meal plan will help you manage your blood glucose and lower your risk of heart disease.  Your health care provider may recommend that you work with a dietitian to make a meal plan that is best for you.  This information is not intended to replace advice given to you by your health care provider. Make sure you discuss any questions you have with your health care provider.  Document Revised: 07/20/2021 Document Reviewed: 07/21/2021  [employer] Patient Education © 2024 [employer]Hypertension, Adult  High blood pressure (hypertension) is when the force of blood pumping through the arteries is too strong. The arteries are the blood vessels that carry blood from the heart throughout the body. Hypertension forces the heart to work harder to pump blood and may cause arteries to become narrow or stiff. Untreated or uncontrolled hypertension can lead to a heart attack, heart failure, a stroke, kidney disease, and other problems.  A blood pressure reading consists of a higher number over a lower number. Ideally, your blood pressure should be below 120/80. The first (\"top\") number is called the systolic pressure. It is a measure of the pressure in your arteries as your heart beats. The second (\"bottom\") number is called the diastolic pressure. It is a " measure of the pressure in your arteries as the heart relaxes.  What are the causes?  The exact cause of this condition is not known. There are some conditions that result in high blood pressure.  What increases the risk?  Certain factors may make you more likely to develop high blood pressure. Some of these risk factors are under your control, including:  Smoking.  Not getting enough exercise or physical activity.  Being overweight.  Having too much fat, sugar, calories, or salt (sodium) in your diet.  Drinking too much alcohol.  Other risk factors include:  Having a personal history of heart disease, diabetes, high cholesterol, or kidney disease.  Stress.  Having a family history of high blood pressure and high cholesterol.  Having obstructive sleep apnea.  Age. The risk increases with age.  What are the signs or symptoms?  High blood pressure may not cause symptoms. Very high blood pressure (hypertensive crisis) may cause:  Headache.  Fast or irregular heartbeats (palpitations).  Shortness of breath.  Nosebleed.  Nausea and vomiting.  Vision changes.  Severe chest pain, dizziness, and seizures.  How is this diagnosed?  This condition is diagnosed by measuring your blood pressure while you are seated, with your arm resting on a flat surface, your legs uncrossed, and your feet flat on the floor. The cuff of the blood pressure monitor will be placed directly against the skin of your upper arm at the level of your heart. Blood pressure should be measured at least twice using the same arm. Certain conditions can cause a difference in blood pressure between your right and left arms.  If you have a high blood pressure reading during one visit or you have normal blood pressure with other risk factors, you may be asked to:  Return on a different day to have your blood pressure checked again.  Monitor your blood pressure at home for 1 week or longer.  If you are diagnosed with hypertension, you may have other blood or  imaging tests to help your health care provider understand your overall risk for other conditions.  How is this treated?  This condition is treated by making healthy lifestyle changes, such as eating healthy foods, exercising more, and reducing your alcohol intake. You may be referred for counseling on a healthy diet and physical activity.  Your health care provider may prescribe medicine if lifestyle changes are not enough to get your blood pressure under control and if:  Your systolic blood pressure is above 130.  Your diastolic blood pressure is above 80.  Your personal target blood pressure may vary depending on your medical conditions, your age, and other factors.  Follow these instructions at home:  Eating and drinking    Eat a diet that is high in fiber and potassium, and low in sodium, added sugar, and fat. An example of this eating plan is called the DASH diet. DASH stands for Dietary Approaches to Stop Hypertension. To eat this way:  Eat plenty of fresh fruits and vegetables. Try to fill one half of your plate at each meal with fruits and vegetables.  Eat whole grains, such as whole-wheat pasta, brown rice, or whole-grain bread. Fill about one fourth of your plate with whole grains.  Eat or drink low-fat dairy products, such as skim milk or low-fat yogurt.  Avoid fatty cuts of meat, processed or cured meats, and poultry with skin. Fill about one fourth of your plate with lean proteins, such as fish, chicken without skin, beans, eggs, or tofu.  Avoid pre-made and processed foods. These tend to be higher in sodium, added sugar, and fat.  Reduce your daily sodium intake. Many people with hypertension should eat less than 1,500 mg of sodium a day.  Do not drink alcohol if:  Your health care provider tells you not to drink.  You are pregnant, may be pregnant, or are planning to become pregnant.  If you drink alcohol:  Limit how much you have to:  0-1 drink a day for women.  0-2 drinks a day for men.  Know how  much alcohol is in your drink. In the [place] one drink equals one 12 oz bottle of beer (355 mL), one 5 oz glass of wine (148 mL), or one 1½ oz glass of hard liquor (44 mL).  Lifestyle    Work with your health care provider to maintain a healthy body weight or to lose weight. Ask what an ideal weight is for you.  Get at least 30 minutes of exercise that causes your heart to beat faster (aerobic exercise) most days of the week. Activities may include walking, swimming, or biking.  Include exercise to strengthen your muscles (resistance exercise), such as Pilates or lifting weights, as part of your weekly exercise routine. Try to do these types of exercises for 30 minutes at least 3 days a week.  Do not use any products that contain nicotine or tobacco. These products include cigarettes, chewing tobacco, and vaping devices, such as e-cigarettes. If you need help quitting, ask your health care provider.  Monitor your blood pressure at home as told by your health care provider.  Keep all follow-up visits. This is important.  Medicines  Take over-the-counter and prescription medicines only as told by your health care provider. Follow directions carefully. Blood pressure medicines must be taken as prescribed.  Do not skip doses of blood pressure medicine. Doing this puts you at risk for problems and can make the medicine less effective.  Ask your health care provider about side effects or reactions to medicines that you should watch for.  Contact a health care provider if you:  Think you are having a reaction to a medicine you are taking.  Have headaches that keep coming back (recurring).  Feel dizzy.  Have swelling in your ankles.  Have trouble with your vision.  Get help right away if you:  Develop a severe headache or confusion.  Have unusual weakness or numbness.  Feel faint.  Have severe pain in your chest or abdomen.  Vomit repeatedly.  Have trouble breathing.  These symptoms may be an emergency. Get help right away.  Call 911.  Do not wait to see if the symptoms will go away.  Do not drive yourself to the hospital.  Summary  Hypertension is when the force of blood pumping through your arteries is too strong. If this condition is not controlled, it may put you at risk for serious complications.  Your personal target blood pressure may vary depending on your medical conditions, your age, and other factors. For most people, a normal blood pressure is less than 120/80.  Hypertension is treated with lifestyle changes, medicines, or a combination of both. Lifestyle changes include losing weight, eating a healthy, low-sodium diet, exercising more, and limiting alcohol.  This information is not intended to replace advice given to you by your health care provider. Make sure you discuss any questions you have with your health care provider.  Document Revised: 10/25/2022 Document Reviewed: 10/25/2022  [employer] Patient Education © 2024 [employer]

## 2025-07-16 NOTE — ASSESSMENT & PLAN NOTE
Worsening on last labs with A1c above 7.  Will recheck today.  Patient is not interested in starting medication at this time and will work on diet and exercise.

## 2025-07-16 NOTE — ASSESSMENT & PLAN NOTE
Depression anxiety are chronic and improving with Zoloft 50 mg daily.  He will continue this for the time being.  Follow-up in 3 months.

## 2025-07-16 NOTE — ASSESSMENT & PLAN NOTE
Patient feels the injections have helped with his pain overall but he began having left shoulder pain that he woke up with about a week ago.  Patient has pain with overhead range of motion.  Range of motion and strength are intact.  Patient is advised to avoid sleeping on that shoulder as it seemed to worsen the symptoms.  He will also restart the naproxen 500 mg twice daily with meals.  Will order x-ray for further evaluation.  Patient was offered physical therapy but politely declines.  RTC/ED precautions given.